# Patient Record
Sex: MALE | Race: WHITE | NOT HISPANIC OR LATINO | Employment: OTHER | URBAN - METROPOLITAN AREA
[De-identification: names, ages, dates, MRNs, and addresses within clinical notes are randomized per-mention and may not be internally consistent; named-entity substitution may affect disease eponyms.]

---

## 2018-04-10 PROCEDURE — 96361 HYDRATE IV INFUSION ADD-ON: CPT

## 2018-04-10 PROCEDURE — 96365 THER/PROPH/DIAG IV INF INIT: CPT

## 2018-04-10 PROCEDURE — 99285 EMERGENCY DEPT VISIT HI MDM: CPT | Mod: 25

## 2018-04-11 ENCOUNTER — HOSPITAL ENCOUNTER (INPATIENT)
Facility: HOSPITAL | Age: 68
LOS: 2 days | Discharge: HOME | DRG: 418 | End: 2018-04-13
Attending: EMERGENCY MEDICINE | Admitting: SURGERY
Payer: MEDICARE

## 2018-04-11 DIAGNOSIS — R10.13 EPIGASTRIC PAIN: ICD-10-CM

## 2018-04-11 DIAGNOSIS — K83.1 BILIARY OBSTRUCTION: Primary | ICD-10-CM

## 2018-04-11 DIAGNOSIS — N17.9 AKI (ACUTE KIDNEY INJURY): ICD-10-CM

## 2018-04-11 DIAGNOSIS — K86.2 PANCREATIC CYST: ICD-10-CM

## 2018-04-11 DIAGNOSIS — R10.9 ABDOMINAL PAIN: ICD-10-CM

## 2018-04-11 DIAGNOSIS — K80.11 CALCULUS OF GALLBLADDER WITH CHOLECYSTITIS WITH BILIARY OBSTRUCTION, UNSPECIFIED CHOLECYSTITIS ACUITY: ICD-10-CM

## 2018-04-11 DIAGNOSIS — R79.89 ELEVATED LFTS: ICD-10-CM

## 2018-04-11 PROBLEM — K80.20 CALCULUS OF GALLBLADDER: Status: ACTIVE | Noted: 2018-04-11

## 2018-04-11 LAB
ALBUMIN SERPL BCP-MCNC: 3.4 G/DL
ALBUMIN SERPL BCP-MCNC: 4.1 G/DL
ALP SERPL-CCNC: 188 U/L
ALP SERPL-CCNC: 194 U/L
ALT SERPL W/O P-5'-P-CCNC: 894 U/L
ALT SERPL W/O P-5'-P-CCNC: 952 U/L
ANION GAP SERPL CALC-SCNC: 12 MMOL/L
AST SERPL-CCNC: 858 U/L
AST SERPL-CCNC: 961 U/L
BASOPHILS # BLD AUTO: 0.02 K/UL
BASOPHILS NFR BLD: 0.3 %
BILIRUB DIRECT SERPL-MCNC: 3.2 MG/DL
BILIRUB SERPL-MCNC: 4.8 MG/DL
BILIRUB SERPL-MCNC: 5.2 MG/DL
BILIRUB UR QL STRIP: ABNORMAL
BUN SERPL-MCNC: 25 MG/DL
CALCIUM SERPL-MCNC: 10.5 MG/DL
CHLORIDE SERPL-SCNC: 101 MMOL/L
CLARITY UR: CLEAR
CO2 SERPL-SCNC: 27 MMOL/L
COLOR UR: YELLOW
CREAT SERPL-MCNC: 1.5 MG/DL
DIFFERENTIAL METHOD: ABNORMAL
EOSINOPHIL # BLD AUTO: 0 K/UL
EOSINOPHIL NFR BLD: 0.5 %
ERYTHROCYTE [DISTWIDTH] IN BLOOD BY AUTOMATED COUNT: 13.7 %
EST. GFR  (AFRICAN AMERICAN): 55 ML/MIN/1.73 M^2
EST. GFR  (NON AFRICAN AMERICAN): 47 ML/MIN/1.73 M^2
GLUCOSE SERPL-MCNC: 157 MG/DL
GLUCOSE UR QL STRIP: NEGATIVE
HCT VFR BLD AUTO: 44.4 %
HGB BLD-MCNC: 15 G/DL
HGB UR QL STRIP: NEGATIVE
KETONES UR QL STRIP: NEGATIVE
LEUKOCYTE ESTERASE UR QL STRIP: NEGATIVE
LIPASE SERPL-CCNC: 61 U/L
LYMPHOCYTES # BLD AUTO: 0.5 K/UL
LYMPHOCYTES NFR BLD: 6.1 %
MAGNESIUM SERPL-MCNC: 2 MG/DL
MCH RBC QN AUTO: 29.5 PG
MCHC RBC AUTO-ENTMCNC: 33.8 G/DL
MCV RBC AUTO: 87 FL
MONOCYTES # BLD AUTO: 0.5 K/UL
MONOCYTES NFR BLD: 6.8 %
NEUTROPHILS # BLD AUTO: 6.5 K/UL
NEUTROPHILS NFR BLD: 86.3 %
NITRITE UR QL STRIP: NEGATIVE
PH UR STRIP: 6 [PH] (ref 5–8)
PLATELET # BLD AUTO: 237 K/UL
PMV BLD AUTO: 8.6 FL
POTASSIUM SERPL-SCNC: 4 MMOL/L
PROT SERPL-MCNC: 6.4 G/DL
PROT SERPL-MCNC: 7.8 G/DL
PROT UR QL STRIP: ABNORMAL
RBC # BLD AUTO: 5.08 M/UL
SODIUM SERPL-SCNC: 140 MMOL/L
SP GR UR STRIP: 1.02 (ref 1–1.03)
URN SPEC COLLECT METH UR: ABNORMAL
UROBILINOGEN UR STRIP-ACNC: 1 EU/DL
WBC # BLD AUTO: 7.48 K/UL

## 2018-04-11 PROCEDURE — 83690 ASSAY OF LIPASE: CPT

## 2018-04-11 PROCEDURE — 99222 1ST HOSP IP/OBS MODERATE 55: CPT | Mod: AI,,, | Performed by: SURGERY

## 2018-04-11 PROCEDURE — 80053 COMPREHEN METABOLIC PANEL: CPT

## 2018-04-11 PROCEDURE — 11000001 HC ACUTE MED/SURG PRIVATE ROOM

## 2018-04-11 PROCEDURE — 25500020 PHARM REV CODE 255: Performed by: EMERGENCY MEDICINE

## 2018-04-11 PROCEDURE — 81003 URINALYSIS AUTO W/O SCOPE: CPT

## 2018-04-11 PROCEDURE — 99222 1ST HOSP IP/OBS MODERATE 55: CPT | Mod: ,,, | Performed by: INTERNAL MEDICINE

## 2018-04-11 PROCEDURE — 85025 COMPLETE CBC W/AUTO DIFF WBC: CPT

## 2018-04-11 PROCEDURE — 25000003 PHARM REV CODE 250: Performed by: EMERGENCY MEDICINE

## 2018-04-11 PROCEDURE — 83735 ASSAY OF MAGNESIUM: CPT

## 2018-04-11 PROCEDURE — 80076 HEPATIC FUNCTION PANEL: CPT

## 2018-04-11 PROCEDURE — 63600175 PHARM REV CODE 636 W HCPCS: Performed by: PHYSICIAN ASSISTANT

## 2018-04-11 RX ORDER — LEVOTHYROXINE SODIUM 150 UG/1
150 TABLET ORAL DAILY
COMMUNITY
Start: 2018-01-29 | End: 2019-01-29

## 2018-04-11 RX ORDER — ONDANSETRON 2 MG/ML
4 INJECTION INTRAMUSCULAR; INTRAVENOUS EVERY 8 HOURS PRN
Status: DISCONTINUED | OUTPATIENT
Start: 2018-04-11 | End: 2018-04-13 | Stop reason: HOSPADM

## 2018-04-11 RX ORDER — DEXTROSE MONOHYDRATE, SODIUM CHLORIDE, AND POTASSIUM CHLORIDE 50; 1.49; 4.5 G/1000ML; G/1000ML; G/1000ML
INJECTION, SOLUTION INTRAVENOUS CONTINUOUS
Status: DISCONTINUED | OUTPATIENT
Start: 2018-04-11 | End: 2018-04-13

## 2018-04-11 RX ORDER — BUTORPHANOL TARTRATE 2 MG/ML
1 INJECTION INTRAMUSCULAR; INTRAVENOUS ONCE
Status: DISCONTINUED | OUTPATIENT
Start: 2018-04-11 | End: 2018-04-11

## 2018-04-11 RX ORDER — ONDANSETRON 2 MG/ML
4 INJECTION INTRAMUSCULAR; INTRAVENOUS
Status: DISCONTINUED | OUTPATIENT
Start: 2018-04-11 | End: 2018-04-11

## 2018-04-11 RX ORDER — ATORVASTATIN CALCIUM 40 MG/1
40 TABLET, FILM COATED ORAL DAILY
COMMUNITY
Start: 2018-01-29

## 2018-04-11 RX ORDER — LISINOPRIL AND HYDROCHLOROTHIAZIDE 10; 12.5 MG/1; MG/1
1 TABLET ORAL DAILY
COMMUNITY
Start: 2018-01-29

## 2018-04-11 RX ADMIN — SODIUM CHLORIDE 1000 ML: 0.9 INJECTION, SOLUTION INTRAVENOUS at 02:04

## 2018-04-11 RX ADMIN — IOHEXOL 30 ML: 350 INJECTION, SOLUTION INTRAVENOUS at 01:04

## 2018-04-11 RX ADMIN — DEXTROSE MONOHYDRATE, SODIUM CHLORIDE, AND POTASSIUM CHLORIDE: 50; 4.5; 1.49 INJECTION, SOLUTION INTRAVENOUS at 07:04

## 2018-04-11 RX ADMIN — ONDANSETRON 4 MG: 2 INJECTION INTRAMUSCULAR; INTRAVENOUS at 11:04

## 2018-04-11 RX ADMIN — DEXTROSE MONOHYDRATE, SODIUM CHLORIDE, AND POTASSIUM CHLORIDE: 50; 4.5; 1.49 INJECTION, SOLUTION INTRAVENOUS at 06:04

## 2018-04-11 RX ADMIN — IOHEXOL 75 ML: 350 INJECTION, SOLUTION INTRAVENOUS at 03:04

## 2018-04-11 NOTE — ASSESSMENT & PLAN NOTE
Elevated LFTs likely related to CBD obstruction; however, none was noted on CT scan.   Agree with MRCP to rule out choledocholithiasis. Possible ERCP later today depending on results.   Repeat LFTs show continued elevation in total bilirubin.   Check acute hepatitis panel.

## 2018-04-11 NOTE — HPI
68-year-old male referred for gallstones/elevated LFTs.  The patient reports 12 hours of abdominal pain/nausea that improved once he was in the ER.  He underwent CT scan which showed gallstones and was noted to have elevated LFTs on labs.  Currently he denies any significant abdominal pain or nausea.  He denies any fevers/chills, change in bowels, emesis, chest pain or shortness of breath

## 2018-04-11 NOTE — H&P
[facility] -   General Surgery  History & Physical    Patient Name: Quincy Araiza  MRN: [id number]  Admission Date: 4/11/2018  Attending Physician: Jesus Cedeno MD   Primary Care Provider: Wade Pineda MD    Patient information was obtained from patient.     Subjective:     Chief Complaint/Reason for Admission: Cholelithiasis/elevated LFTs    History of Present Illness: 68-year-old male referred for gallstones/elevated LFTs.  The patient reports 12 hours of abdominal pain/nausea that improved once he was in the ER.  He underwent CT scan which showed gallstones and was noted to have elevated LFTs on labs.  Currently he denies any significant abdominal pain or nausea.  He denies any fevers/chills, change in bowels, emesis, chest pain or shortness of breath    No current facility-administered medications on file prior to encounter.      No current outpatient prescriptions on file prior to encounter.       Review of patient's allergies indicates:   Allergen Reactions    Hydrocodone Hives       Past Medical History:   Diagnosis Date    Graves disease     Hyperlipidemia     Hypertension      History reviewed. No pertinent surgical history.  Family History     None        Social History Main Topics    Smoking status: Never Smoker    Smokeless tobacco: Never Used    Alcohol use No    Drug use: No    Sexual activity: Not on file     Review of Systems   Constitutional: Negative for chills, fever and unexpected weight change.   HENT: Negative for congestion.    Eyes: Negative for visual disturbance.   Respiratory: Negative for shortness of breath.    Cardiovascular: Negative for chest pain.   Gastrointestinal: Positive for abdominal pain and nausea. Negative for abdominal distention, constipation, rectal pain and vomiting.   Genitourinary: Negative for dysuria.   Musculoskeletal: Negative for arthralgias.   Skin: Negative for rash.   Neurological: Negative for light-headedness.   Hematological:  Negative for adenopathy.     Objective:     Vital Signs (Most Recent):  Temp: 98 °F (36.7 °C) (04/11/18 0536)  Pulse: 65 (04/11/18 0655)  Resp: 18 (04/11/18 0655)  BP: (!) 144/70 (04/11/18 0655)  SpO2: 95 % (04/11/18 0655) Vital Signs (24h Range):  Temp:  [98 °F (36.7 °C)-98.5 °F (36.9 °C)] 98 °F (36.7 °C)  Pulse:  [65-73] 65  Resp:  [18-20] 18  SpO2:  [95 %-97 %] 95 %  BP: (121-144)/(64-70) 144/70     Weight: 97.2 kg (214 lb 4.6 oz)  Body mass index is 27.51 kg/m².    Physical Exam   Constitutional: He is oriented to person, place, and time. He appears well-developed and well-nourished. No distress.   HENT:   Head: Normocephalic and atraumatic.   Eyes: EOM are normal.   Neck: Normal range of motion. Neck supple.   Cardiovascular: Normal rate and regular rhythm.    Pulmonary/Chest: Effort normal and breath sounds normal.   Abdominal: Soft. Bowel sounds are normal. He exhibits no distension. There is no tenderness.   Neurological: He is alert and oriented to person, place, and time.   Skin: Skin is warm and dry. He is not diaphoretic.   Vitals reviewed.      Significant Labs:  CBC:   Recent Labs  Lab 04/11/18  0137   WBC 7.48   RBC 5.08   HGB 15.0   HCT 44.4      MCV 87   MCH 29.5   MCHC 33.8     CMP:   Recent Labs  Lab 04/11/18 0137   *   CALCIUM 10.5   ALBUMIN 4.1   PROT 7.8      K 4.0   CO2 27      BUN 25*   CREATININE 1.5*   ALKPHOS 188*   *   *   BILITOT 4.8*       Significant Diagnostics:  CT:  Small sliding hiatal hernia with contrast seen within the distal esophagus which may reflect reflux disease and/or esophagitis.    1.6 cm hypodense lesion within the body the pancreas is not fully characterized on the current exam. Recommend followup MRI.    Gallstone within the gallbladder fundus with mild wall thickening. No evidence of pericholecystic fluid.. Ultrasound can be obtained as clinically warranted.    Hazy stranding within the mesenteric fat with small lymph nodes  can be seen in the setting of mesenteric adenitis or panniculitis    Assessment/Plan:     * Elevated LFTs    GI consult  MRCP to evaluate for choledocholithiasis or CBD obstruction  If obstruction noted will likely need ERCP, if no obstruction or plan for cholecystectomy tomorrow        Calculus of gallbladder    Patient will likely need cholecystectomy prior to discharge.  Undergoing evaluation for choledocholithiasis          VTE Risk Mitigation         Ordered     IP VTE LOW RISK PATIENT  Once      04/11/18 0625     Place SHELLI hose  Until discontinued      04/11/18 0625     Place sequential compression device  Until discontinued      04/11/18 0625          Jesus Cedeno MD  General Surgery  [facility] [place]

## 2018-04-11 NOTE — ED NOTES
Pt lying in bed, aaox4, in no acute distress, and with no complaint. Pt aware he is awaiting general surgery consult. Will continue to monitor. Bed in low position, side rails up, call light in reach, spouse at bedside.

## 2018-04-11 NOTE — ED PROVIDER NOTES
SCRIBE #1 NOTE: I, Sarika Tijerina, am scribing for, and in the presence of, Bahman Jeffers MD. I have scribed the HPI, ROS, and PEx.    SCRIBE #2 NOTE: I, Sharath Kruger, am scribing for, and in the presence of,  Santo Khan MD. I have scribed the remaining portions of the note not scribed by Scribe #1.     History      Chief Complaint   Patient presents with    Abdominal Pain     pt reports abdominal cramping that started after lunch and has worsened as the day went on; denies n/v/d       Review of patient's allergies indicates:   Allergen Reactions    Hydrocodone Hives        HPI   HPI    4/11/2018, 12:26 AM   History obtained from the patient      History of Present Illness: Quincy Araiza is a 68 y.o. male patient with PMHx of Prostate CA, PSHx Prostatectomy who presents to the Emergency Department for epigastric abd pain which onset gradually after pt ate lunch yesterday. Symptoms are worsening and moderate in severity. Standing/sitting upright mitigate sxs, no exacerbating factors. No other associated sxs. Patient denies any fever, chills, n/v/d, constipation, hematochezia, dysuria, hematuria, frequency, EtOH use, and all other sxs at this time. Pt's last BM was at 2000 yesterday. No further complaints or concerns at this time.       Arrival mode: Personal vehicle     PCP: Wade Pineda MD       Past Medical History:  Past Medical History:   Diagnosis Date    Graves disease     Hyperlipidemia     Hypertension        Past Surgical History:  History reviewed. No pertinent surgical history.      Family History:  History reviewed. No pertinent family history.    Social History:  Social History     Social History Main Topics    Smoking status: Never Smoker    Smokeless tobacco: Never Used    Alcohol use No    Drug use: No    Sexual activity: Unknown       ROS   Review of Systems   Constitutional: Negative for chills and fever.   HENT: Negative for sore throat.    Respiratory: Negative  for shortness of breath.    Cardiovascular: Negative for chest pain.   Gastrointestinal: Positive for abdominal pain (epigastric). Negative for blood in stool, constipation, diarrhea, nausea and vomiting.   Genitourinary: Negative for dysuria, frequency and hematuria.   Musculoskeletal: Negative for back pain.   Skin: Negative for rash.   Neurological: Negative for weakness.   Hematological: Does not bruise/bleed easily.   All other systems reviewed and are negative.    Physical Exam      Initial Vitals [04/10/18 2321]   BP Pulse Resp Temp SpO2   128/68 73 20 98.5 °F (36.9 °C) 95 %      MAP       88          Physical Exam  Nursing Notes and Vital Signs Reviewed.  Constitutional: Patient is in mild distress. Well-developed and well-nourished. Appears uncomfortable.   Head: Atraumatic. Normocephalic.  Eyes: PERRL. EOM intact. Conjunctivae are not pale. No scleral icterus.  ENT: Mucous membranes are moist. Oropharynx is clear and symmetric.    Neck: Supple. Full ROM. No lymphadenopathy.  Cardiovascular: Regular rate. Regular rhythm. No murmurs, rubs, or gallops. Distal pulses are 2+ and symmetric.  Pulmonary/Chest: No respiratory distress. Clear to auscultation bilaterally. No wheezing or rales.  Abdominal: Soft and non-distended.  There is mild, generalized tenderness. Mild guarding to epigastric region. No rebound or rigidity. Good bowel sounds. No masses.   Musculoskeletal: Moves all extremities. No obvious deformities. No edema. No calf tenderness.  Skin: Warm and dry.  Neurological:  Alert, awake, and appropriate.  Normal speech.  No acute focal neurological deficits are appreciated.  Psychiatric: Normal affect. Good eye contact. Appropriate in content.    ED Course    Procedures  ED Vital Signs:  Vitals:    04/10/18 2321 04/11/18 0135 04/11/18 0226 04/11/18 0330   BP: 128/68 121/67 125/66 129/64   Pulse: 73 67 68 71   Resp: 20 18 18 18   Temp: 98.5 °F (36.9 °C) 98.1 °F (36.7 °C)     TempSrc: Oral Oral     SpO2:  "95% 97% 97% 97%   Weight: 97.2 kg (214 lb 4.6 oz)      Height: 6' 2" (1.88 m)       04/11/18 0423 04/11/18 0536 04/11/18 0655 04/11/18 0910   BP: 125/69 124/68 (!) 144/70 (!) 148/82   Pulse: 69 71 65 71   Resp: 18 18 18 18   Temp:  98 °F (36.7 °C)  97.8 °F (36.6 °C)   TempSrc:  Oral  Oral   SpO2: 96% 96% 95% 95%   Weight:    98.7 kg (217 lb 9.5 oz)   Height:    6' 2" (1.88 m)    04/11/18 1129 04/11/18 1622 04/11/18 1929 04/11/18 2329   BP: 121/73 124/64 128/69 129/73   Pulse: 66 72 75 73   Resp: 18 18 18 18   Temp: 98.1 °F (36.7 °C) 98.3 °F (36.8 °C) 97.9 °F (36.6 °C) 98.7 °F (37.1 °C)   TempSrc: Oral Oral Oral Oral   SpO2: 96% (!) 94% (!) 93% 96%   Weight:       Height:           Abnormal Lab Results:  Labs Reviewed   CBC W/ AUTO DIFFERENTIAL - Abnormal; Notable for the following:        Result Value    MPV 8.6 (*)     Lymph # 0.5 (*)     Gran% 86.3 (*)     Lymph% 6.1 (*)     All other components within normal limits   COMPREHENSIVE METABOLIC PANEL - Abnormal; Notable for the following:     Glucose 157 (*)     BUN, Bld 25 (*)     Creatinine 1.5 (*)     Total Bilirubin 4.8 (*)     Alkaline Phosphatase 188 (*)      (*)      (*)     eGFR if  55 (*)     eGFR if non  47 (*)     All other components within normal limits   LIPASE - Abnormal; Notable for the following:     Lipase 61 (*)     All other components within normal limits   URINALYSIS - Abnormal; Notable for the following:     Protein, UA Trace (*)     Bilirubin (UA) 2+ (*)     All other components within normal limits   MAGNESIUM        All Lab Results:  Results for orders placed or performed during the hospital encounter of 04/11/18   Magnesium   Result Value Ref Range    Magnesium 2.0 1.6 - 2.6 mg/dL   CBC auto differential   Result Value Ref Range    WBC 7.48 3.90 - 12.70 K/uL    RBC 5.08 4.60 - 6.20 M/uL    Hemoglobin 15.0 14.0 - 18.0 g/dL    Hematocrit 44.4 40.0 - 54.0 %    MCV 87 82 - 98 fL    MCH 29.5 27.0 - " 31.0 pg    MCHC 33.8 32.0 - 36.0 g/dL    RDW 13.7 11.5 - 14.5 %    Platelets 237 150 - 350 K/uL    MPV 8.6 (L) 9.2 - 12.9 fL    Gran # (ANC) 6.5 1.8 - 7.7 K/uL    Lymph # 0.5 (L) 1.0 - 4.8 K/uL    Mono # 0.5 0.3 - 1.0 K/uL    Eos # 0.0 0.0 - 0.5 K/uL    Baso # 0.02 0.00 - 0.20 K/uL    Gran% 86.3 (H) 38.0 - 73.0 %    Lymph% 6.1 (L) 18.0 - 48.0 %    Mono% 6.8 4.0 - 15.0 %    Eosinophil% 0.5 0.0 - 8.0 %    Basophil% 0.3 0.0 - 1.9 %    Differential Method Automated    Comprehensive metabolic panel   Result Value Ref Range    Sodium 140 136 - 145 mmol/L    Potassium 4.0 3.5 - 5.1 mmol/L    Chloride 101 95 - 110 mmol/L    CO2 27 23 - 29 mmol/L    Glucose 157 (H) 70 - 110 mg/dL    BUN, Bld 25 (H) 8 - 23 mg/dL    Creatinine 1.5 (H) 0.5 - 1.4 mg/dL    Calcium 10.5 8.7 - 10.5 mg/dL    Total Protein 7.8 6.0 - 8.4 g/dL    Albumin 4.1 3.5 - 5.2 g/dL    Total Bilirubin 4.8 (H) 0.1 - 1.0 mg/dL    Alkaline Phosphatase 188 (H) 55 - 135 U/L     (H) 10 - 40 U/L     (H) 10 - 44 U/L    Anion Gap 12 8 - 16 mmol/L    eGFR if African American 55 (A) >60 mL/min/1.73 m^2    eGFR if non African American 47 (A) >60 mL/min/1.73 m^2   Lipase   Result Value Ref Range    Lipase 61 (H) 4 - 60 U/L   Urinalysis   Result Value Ref Range    Specimen UA Urine, Clean Catch     Color, UA Yellow Yellow, Straw, Sofya    Appearance, UA Clear Clear    pH, UA 6.0 5.0 - 8.0    Specific Gravity, UA 1.025 1.005 - 1.030    Protein, UA Trace (A) Negative    Glucose, UA Negative Negative    Ketones, UA Negative Negative    Bilirubin (UA) 2+ (A) Negative    Occult Blood UA Negative Negative    Nitrite, UA Negative Negative    Urobilinogen, UA 1.0 <2.0 EU/dL    Leukocytes, UA Negative Negative   Hepatic function panel   Result Value Ref Range    Total Protein 6.4 6.0 - 8.4 g/dL    Albumin 3.4 (L) 3.5 - 5.2 g/dL    Total Bilirubin 5.2 (H) 0.1 - 1.0 mg/dL    Bilirubin, Direct 3.2 (H) 0.1 - 0.3 mg/dL     (H) 10 - 40 U/L     (H) 10 - 44 U/L     "Alkaline Phosphatase 194 (H) 55 - 135 U/L       Imaging Results:    Per [facility] radi[facility], pt's CT  Abdomen and Pelvis with IV contrast results:   1) Small sliding hiatal hernia. Small amount of contrast in distal esophagus, which is mildly thickened, suggesting possible reflux esophagitis.  2) Gallstone in the gallbladder fundus. Gallbladder is not distended and there is no CT evidence of cholecystitis.  3) Mild increased density of central mesenteric fat and prominent number of mesenteric lymph nodes, nonspecific but suggesting possible mesenteric adenitis.  4) 1.6 cm hypodense lesion within the body of the pancreas. Recommend a follow-up abdominal MRI in 1 year.              The Emergency Provider reviewed the vital signs and test results, which are outlined above.    ED Discussion     1:36 AM: Pt states his pain worsened after the abdominal exam. He states he walked around the room and became nauseous. He states he "felt a pop" in his abdomen. After this pop his pain and nausea subsided. Pt is agreeable to continue with workup but states he no longer needs pain medication or anti-emetics.     2:10 AM: Pt reports dark urine, possible hematuria, at this time. He states this was not present last time her urinated.     2:15 AM: Dr. Jeffers transfers care of pt to Dr. Khan, pending CT/lab results.    5:04 AM: Dr. Khan discussed the pt's case with Dr. Cedeno (General Surgery) who recommends admitting pt to general surgery and consulting GI.    5:27 AM: Discussed case with Dr. Cedeno (General Surgery) who agrees with current care and management of pt and accepts admission.   Admitting Service: General Surgery  Admitting Physician: Dr. Cedeno  Admit to: Med/surg    5:27 AM: Re-evaluated pt. I have discussed test results, shared treatment plan, and the need for admission with patient and family at bedside. Pt and family express understanding at this time and agree with all information. All questions answered. Pt and " family have no further questions or concerns at this time. Pt is ready for admit.        ED Medication(s):  Medications   dextrose 5 % and 0.45 % NaCl with KCl 20 mEq infusion ( Intravenous New Bag 4/11/18 1823)   ondansetron injection 4 mg (4 mg Intravenous Given 4/11/18 1116)   sodium chloride 0.9% bolus 1,000 mL (0 mLs Intravenous Stopped 4/11/18 0423)   omnipaque 350 iohexol 30 mL (30 mLs Oral Given 4/11/18 0100)   omnipaque 350 iohexol 75 mL (75 mLs Intravenous Given 4/11/18 0312)       Current Discharge Medication List                Medical Decision Making    Medical Decision Making:   Clinical Tests:   Lab Tests: Ordered and Reviewed  Radiological Study: Ordered and Reviewed           Scribe Attestation:   Scribe #1: I performed the above scribed service and the documentation accurately describes the services I performed. I attest to the accuracy of the note.    Attending:   Physician Attestation Statement for Scribe #1: I, Bahman Jeffers MD, personally performed the services described in this documentation, as scribed by Sarika Tijerina, in my presence, and it is both accurate and complete.       Scribe Attestation:   Scribe #2: I performed the above scribed service and the documentation accurately describes the services I performed. I attest to the accuracy of the note.    Attending Attestation:           Physician Attestation for Scribe:    Physician Attestation Statement for Scribe #2: I, Santo Khan MD, reviewed documentation, as scribed by Sharath Kruger in my presence, and it is both accurate and complete. I also acknowledge and confirm the content of the note done by Scribe #1.          Clinical Impression       ICD-10-CM ICD-9-CM   1. Biliary obstruction K83.1 576.2   2. Abdominal pain R10.9 789.00   3. MICHAEL (acute kidney injury) N17.9 584.9   4. Elevated LFTs R79.89 790.6   5. Calculus of gallbladder with cholecystitis with biliary obstruction, unspecified cholecystitis acuity K80.01 574.01    6. Epigastric pain R10.13 789.06   7. Pancreatic cyst K86.2 577.2       Disposition:   Disposition: Admitted  Condition: Fair         Santo Khan MD  04/12/18 0222       Santo Khan MD  04/12/18 0224

## 2018-04-11 NOTE — HPI
The patient presented to the ER for abdominal pain. The pain started yesterday after lunch with worsening throughout the evening. He reports spontaneous resolution while in the ER. He didn't have nausea or vomiting initially but reports mild nausea now. He denies any lower GI symptoms. ER work up revealed elevated LFTs and a CT scan showed gallstone with wall thickening. General surgery has admitted him and an MRCP was ordered. The patient denies risk factors for viral hepatitis. He takes NSAIDs about three days a week and a baby Aspirin at least once daily.

## 2018-04-11 NOTE — SUBJECTIVE & OBJECTIVE
Past Medical History:   Diagnosis Date    Graves disease     Hyperlipidemia     Hypertension        History reviewed. No pertinent surgical history.    Review of patient's allergies indicates:   Allergen Reactions    Hydrocodone Hives     Family History     None        Social History Main Topics    Smoking status: Never Smoker    Smokeless tobacco: Never Used    Alcohol use No    Drug use: No    Sexual activity: Not on file     Review of Systems   Constitutional: Negative for fatigue and fever.   HENT: Negative for hearing loss.    Eyes: Negative for visual disturbance.   Respiratory: Negative for cough and shortness of breath.    Cardiovascular: Negative for chest pain and palpitations.   Gastrointestinal:        As per HPI.   Genitourinary: Negative for difficulty urinating, dysuria, frequency and hematuria.   Musculoskeletal: Positive for arthralgias. Negative for back pain.   Skin: Negative for color change and rash.   Neurological: Negative for dizziness, seizures, syncope, weakness, numbness and headaches.   Hematological: Does not bruise/bleed easily.   Psychiatric/Behavioral: The patient is not nervous/anxious.      Objective:     Vital Signs (Most Recent):  Temp: 97.8 °F (36.6 °C) (04/11/18 0910)  Pulse: 71 (04/11/18 0910)  Resp: 18 (04/11/18 0910)  BP: (!) 148/82 (04/11/18 0910)  SpO2: 95 % (04/11/18 0910) Vital Signs (24h Range):  Temp:  [97.8 °F (36.6 °C)-98.5 °F (36.9 °C)] 97.8 °F (36.6 °C)  Pulse:  [65-73] 71  Resp:  [18-20] 18  SpO2:  [95 %-97 %] 95 %  BP: (121-148)/(64-82) 148/82     Weight: 98.7 kg (217 lb 9.5 oz) (04/11/18 0910)  Body mass index is 27.94 kg/m².      Intake/Output Summary (Last 24 hours) at 04/11/18 1107  Last data filed at 04/11/18 0429   Gross per 24 hour   Intake             1000 ml   Output                0 ml   Net             1000 ml       Lines/Drains/Airways     Peripheral Intravenous Line                 Peripheral IV - Single Lumen 04/11/18 0137 Right Antecubital  less than 1 day                Physical Exam   Constitutional: He is oriented to person, place, and time. He appears well-developed and well-nourished.   HENT:   Head: Normocephalic and atraumatic.   Eyes: EOM are normal.   Neck: Normal range of motion. Neck supple.   Cardiovascular: Normal rate, regular rhythm and normal heart sounds.    No murmur heard.  Pulmonary/Chest: Effort normal and breath sounds normal. No respiratory distress. He has no wheezes.   Abdominal: Soft. Bowel sounds are normal. He exhibits no distension and no mass. There is no hepatomegaly. There is no tenderness.   Musculoskeletal: He exhibits no edema.   Neurological: He is alert and oriented to person, place, and time. No cranial nerve deficit. Gait normal.   Skin: Skin is warm and dry. No rash noted.   Psychiatric: He has a normal mood and affect.       Significant Labs:  CBC:   Recent Labs  Lab 04/11/18 0137   WBC 7.48   HGB 15.0   HCT 44.4        CMP:   Recent Labs  Lab 04/11/18 0137 04/11/18  0826   *  --    CALCIUM 10.5  --    ALBUMIN 4.1 3.4*   PROT 7.8 6.4     --    K 4.0  --    CO2 27  --      --    BUN 25*  --    CREATININE 1.5*  --    ALKPHOS 188* 194*   * 952*   * 858*   BILITOT 4.8* 5.2*       Significant Imaging:  Imaging results within the past 24 hours have been reviewed.

## 2018-04-11 NOTE — ED NOTES
Pt denies the need for pain or nausea meds, stating he feels much better. Pt with no current complaint. Pt also refusing fluids at this time.

## 2018-04-11 NOTE — ASSESSMENT & PLAN NOTE
Patient will likely need cholecystectomy prior to discharge.  Undergoing evaluation for choledocholithiasis

## 2018-04-11 NOTE — ED NOTES
Pt sitting upright in bed, aaox4, in no acute distress, and with no complaint. Pt aware he is awaiting CT and results. Will continue to monitor. Bed in low position, side rails up, call light in reach, spouse at bedside.

## 2018-04-11 NOTE — PLAN OF CARE
Problem: Patient Care Overview  Goal: Plan of Care Review  Outcome: Ongoing (interventions implemented as appropriate)  Fall precautions maintained, pt free from injuries/fall.  Repositions and ambulates independently.  C/o some abdominal discomfort and some nausea.  PRN meds given as needed.  NPO at this time, awaiting MRCP.  POC and meds discussed with pt, pt verbalized understanding.  Side rails x 2, bed locked and low, phone and call light w/in reach.  Chart check done. Will cont to monitor.

## 2018-04-11 NOTE — ED NOTES
Pt states he does not want to change into gown at this time. Pt to remain in personal clothes upon request.

## 2018-04-11 NOTE — ED NOTES
Pt lying in bed, aaox4, in no acute distress, and with no complaint. Pt states he spoke with Dr Khan and is aware of plan to admit for biliary obstruction. Will continue to monitor. Bed in low position, side rails up, call light in reach.

## 2018-04-11 NOTE — CONSULTS
[facility] -   Gastroenterology  Consult Note    Patient Name: Quincy Araiza  MRN: [id number]  Admission Date: 4/11/2018  Hospital Length of Stay: 0 days  Code Status: Full Code   Attending Provider: Jesus Cedeno MD   Consulting Provider: Stormy Mello PA-C  Primary Care Physician: Wade Pineda MD  Principal Problem:Elevated LFTs    Inpatient consult to Gastroenterology  Consult performed by: STORMY MELLO  Consult ordered by: VI DURAN  Reason for consult: Suspected choledocholithiasis        Subjective:     HPI:  The patient presented to the ER for abdominal pain. The pain started yesterday after lunch with worsening throughout the evening. He reports spontaneous resolution while in the ER. He didn't have nausea or vomiting initially but reports mild nausea now. He denies any lower GI symptoms. ER work up revealed elevated LFTs and a CT scan showed gallstone with wall thickening. General surgery has admitted him and an MRCP was ordered. The patient denies risk factors for viral hepatitis. He takes NSAIDs about three days a week and a baby Aspirin at least once daily.     Past Medical History:   Diagnosis Date    Graves disease     Hyperlipidemia     Hypertension        History reviewed. No pertinent surgical history.    Review of patient's allergies indicates:   Allergen Reactions    Hydrocodone Hives     Family History     None        Social History Main Topics    Smoking status: Never Smoker    Smokeless tobacco: Never Used    Alcohol use No    Drug use: No    Sexual activity: Not on file     Review of Systems   Constitutional: Negative for fatigue and fever.   HENT: Negative for hearing loss.    Eyes: Negative for visual disturbance.   Respiratory: Negative for cough and shortness of breath.    Cardiovascular: Negative for chest pain and palpitations.   Gastrointestinal:        As per HPI.   Genitourinary: Negative for difficulty urinating, dysuria, frequency and  hematuria.   Musculoskeletal: Positive for arthralgias. Negative for back pain.   Skin: Negative for color change and rash.   Neurological: Negative for dizziness, seizures, syncope, weakness, numbness and headaches.   Hematological: Does not bruise/bleed easily.   Psychiatric/Behavioral: The patient is not nervous/anxious.      Objective:     Vital Signs (Most Recent):  Temp: 97.8 °F (36.6 °C) (04/11/18 0910)  Pulse: 71 (04/11/18 0910)  Resp: 18 (04/11/18 0910)  BP: (!) 148/82 (04/11/18 0910)  SpO2: 95 % (04/11/18 0910) Vital Signs (24h Range):  Temp:  [97.8 °F (36.6 °C)-98.5 °F (36.9 °C)] 97.8 °F (36.6 °C)  Pulse:  [65-73] 71  Resp:  [18-20] 18  SpO2:  [95 %-97 %] 95 %  BP: (121-148)/(64-82) 148/82     Weight: 98.7 kg (217 lb 9.5 oz) (04/11/18 0910)  Body mass index is 27.94 kg/m².      Intake/Output Summary (Last 24 hours) at 04/11/18 1107  Last data filed at 04/11/18 0423   Gross per 24 hour   Intake             1000 ml   Output                0 ml   Net             1000 ml       Lines/Drains/Airways     Peripheral Intravenous Line                 Peripheral IV - Single Lumen 04/11/18 0137 Right Antecubital less than 1 day                Physical Exam   Constitutional: He is oriented to person, place, and time. He appears well-developed and well-nourished.   HENT:   Head: Normocephalic and atraumatic.   Eyes: EOM are normal.   Neck: Normal range of motion. Neck supple.   Cardiovascular: Normal rate, regular rhythm and normal heart sounds.    No murmur heard.  Pulmonary/Chest: Effort normal and breath sounds normal. No respiratory distress. He has no wheezes.   Abdominal: Soft. Bowel sounds are normal. He exhibits no distension and no mass. There is no hepatomegaly. There is no tenderness.   Musculoskeletal: He exhibits no edema.   Neurological: He is alert and oriented to person, place, and time. No cranial nerve deficit. Gait normal.   Skin: Skin is warm and dry. No rash noted.   Psychiatric: He has a normal  mood and affect.       Significant Labs:  CBC:   Recent Labs  Lab 04/11/18 0137   WBC 7.48   HGB 15.0   HCT 44.4        CMP:   Recent Labs  Lab 04/11/18 0137 04/11/18  0826   *  --    CALCIUM 10.5  --    ALBUMIN 4.1 3.4*   PROT 7.8 6.4     --    K 4.0  --    CO2 27  --      --    BUN 25*  --    CREATININE 1.5*  --    ALKPHOS 188* 194*   * 952*   * 858*   BILITOT 4.8* 5.2*       Significant Imaging:  Imaging results within the past 24 hours have been reviewed.    Assessment/Plan:     * Elevated LFTs    Elevated LFTs likely related to CBD obstruction; however, none was noted on CT scan.   Agree with MRCP to rule out choledocholithiasis. Possible ERCP later today depending on results.   Repeat LFTs show continued elevation in total bilirubin.   Check acute hepatitis panel.           Calculus of gallbladder    Agree with MRCP.   Cholecystectomy planned during this admission.         Abnormal CT scan  The patient also had a pancreatic lesion on CT scan. Wait results of MRCP.     Thank you for your consult. I will follow-up with patient. Please contact us if you have any additional questions.    Rojelio Mello PA-C  Gastroenterology  [facility]

## 2018-04-11 NOTE — SUBJECTIVE & OBJECTIVE
No current facility-administered medications on file prior to encounter.      No current outpatient prescriptions on file prior to encounter.       Review of patient's allergies indicates:   Allergen Reactions    Hydrocodone Hives       Past Medical History:   Diagnosis Date    Graves disease     Hyperlipidemia     Hypertension      History reviewed. No pertinent surgical history.  Family History     None        Social History Main Topics    Smoking status: Never Smoker    Smokeless tobacco: Never Used    Alcohol use No    Drug use: No    Sexual activity: Not on file     Review of Systems   Constitutional: Negative for chills, fever and unexpected weight change.   HENT: Negative for congestion.    Eyes: Negative for visual disturbance.   Respiratory: Negative for shortness of breath.    Cardiovascular: Negative for chest pain.   Gastrointestinal: Positive for abdominal pain and nausea. Negative for abdominal distention, constipation, rectal pain and vomiting.   Genitourinary: Negative for dysuria.   Musculoskeletal: Negative for arthralgias.   Skin: Negative for rash.   Neurological: Negative for light-headedness.   Hematological: Negative for adenopathy.     Objective:     Vital Signs (Most Recent):  Temp: 98 °F (36.7 °C) (04/11/18 0536)  Pulse: 65 (04/11/18 0655)  Resp: 18 (04/11/18 0655)  BP: (!) 144/70 (04/11/18 0655)  SpO2: 95 % (04/11/18 0655) Vital Signs (24h Range):  Temp:  [98 °F (36.7 °C)-98.5 °F (36.9 °C)] 98 °F (36.7 °C)  Pulse:  [65-73] 65  Resp:  [18-20] 18  SpO2:  [95 %-97 %] 95 %  BP: (121-144)/(64-70) 144/70     Weight: 97.2 kg (214 lb 4.6 oz)  Body mass index is 27.51 kg/m².    Physical Exam   Constitutional: He is oriented to person, place, and time. He appears well-developed and well-nourished. No distress.   HENT:   Head: Normocephalic and atraumatic.   Eyes: EOM are normal.   Neck: Normal range of motion. Neck supple.   Cardiovascular: Normal rate and regular rhythm.     Pulmonary/Chest: Effort normal and breath sounds normal.   Abdominal: Soft. Bowel sounds are normal. He exhibits no distension. There is no tenderness.   Neurological: He is alert and oriented to person, place, and time.   Skin: Skin is warm and dry. He is not diaphoretic.   Vitals reviewed.      Significant Labs:  CBC:   Recent Labs  Lab 04/11/18 0137   WBC 7.48   RBC 5.08   HGB 15.0   HCT 44.4      MCV 87   MCH 29.5   MCHC 33.8     CMP:   Recent Labs  Lab 04/11/18 0137   *   CALCIUM 10.5   ALBUMIN 4.1   PROT 7.8      K 4.0   CO2 27      BUN 25*   CREATININE 1.5*   ALKPHOS 188*   *   *   BILITOT 4.8*       Significant Diagnostics:  CT:  Small sliding hiatal hernia with contrast seen within the distal esophagus which may reflect reflux disease and/or esophagitis.    1.6 cm hypodense lesion within the body the pancreas is not fully characterized on the current exam. Recommend followup MRI.    Gallstone within the gallbladder fundus with mild wall thickening. No evidence of pericholecystic fluid.. Ultrasound can be obtained as clinically warranted.    Hazy stranding within the mesenteric fat with small lymph nodes can be seen in the setting of mesenteric adenitis or panniculitis

## 2018-04-11 NOTE — ED NOTES
Pt sitting upright in bed, aaox4, in no acute distress, and with no complaint. Pt aware he is awaiting results. Will continue to monitor. Bed in low position, side rails up, call light in reach, spouse at bedside.

## 2018-04-11 NOTE — ED NOTES
Pt AAOx3, resting in bed, side rails up x 2, call bell within reach. NAD at this time. Will continue to monitor.

## 2018-04-11 NOTE — H&P
[facility] -   General Surgery  History & Physical    Patient Name: Quincy Araiza  MRN: [id number]  Admission Date: 4/11/2018  Attending Physician: Jesus Cedeno MD   Primary Care Provider: Wade Pineda MD    Patient information was obtained from patient.     Subjective:     Chief Complaint/Reason for Admission: Elevated LFTs, cholelithiasis    History of Present Illness: 68-year-old male referred for gallstones/elevated LFTs.  The patient reports 12 hours of abdominal pain/nausea that improved once he was in the ER.  He underwent CT scan which showed gallstones and was noted to have elevated LFTs on labs.  Currently he denies any significant abdominal pain or nausea.  He denies any fevers/chills, change in bowels, emesis, chest pain or shortness of breath    No current facility-administered medications on file prior to encounter.      No current outpatient prescriptions on file prior to encounter.       Review of patient's allergies indicates:   Allergen Reactions    Hydrocodone Hives       Past Medical History:   Diagnosis Date    Graves disease     Hyperlipidemia     Hypertension      History reviewed. No pertinent surgical history.  Family History     None        Social History Main Topics    Smoking status: Never Smoker    Smokeless tobacco: Never Used    Alcohol use No    Drug use: No    Sexual activity: Not on file     Review of Systems   Constitutional: Negative for chills, fever and unexpected weight change.   HENT: Negative for congestion.    Eyes: Negative for visual disturbance.   Respiratory: Negative for shortness of breath.    Cardiovascular: Negative for chest pain.   Gastrointestinal: Positive for abdominal pain and nausea. Negative for abdominal distention, constipation, rectal pain and vomiting.   Genitourinary: Negative for dysuria.   Musculoskeletal: Negative for arthralgias.   Skin: Negative for rash.   Neurological: Negative for light-headedness.   Hematological:  Negative for adenopathy.     Objective:     Vital Signs (Most Recent):  Temp: 98 °F (36.7 °C) (04/11/18 0536)  Pulse: 65 (04/11/18 0655)  Resp: 18 (04/11/18 0655)  BP: (!) 144/70 (04/11/18 0655)  SpO2: 95 % (04/11/18 0655) Vital Signs (24h Range):  Temp:  [98 °F (36.7 °C)-98.5 °F (36.9 °C)] 98 °F (36.7 °C)  Pulse:  [65-73] 65  Resp:  [18-20] 18  SpO2:  [95 %-97 %] 95 %  BP: (121-144)/(64-70) 144/70     Weight: 97.2 kg (214 lb 4.6 oz)  Body mass index is 27.51 kg/m².    Physical Exam   Constitutional: He is oriented to person, place, and time. He appears well-developed and well-nourished. No distress.   HENT:   Head: Normocephalic and atraumatic.   Eyes: EOM are normal.   Neck: Normal range of motion. Neck supple.   Cardiovascular: Normal rate and regular rhythm.    Pulmonary/Chest: Effort normal and breath sounds normal.   Abdominal: Soft. Bowel sounds are normal. He exhibits no distension. There is no tenderness.   Neurological: He is alert and oriented to person, place, and time.   Skin: Skin is warm and dry. He is not diaphoretic.   Vitals reviewed.      Significant Labs:  CBC:   Recent Labs  Lab 04/11/18  0137   WBC 7.48   RBC 5.08   HGB 15.0   HCT 44.4      MCV 87   MCH 29.5   MCHC 33.8     CMP:   Recent Labs  Lab 04/11/18 0137   *   CALCIUM 10.5   ALBUMIN 4.1   PROT 7.8      K 4.0   CO2 27      BUN 25*   CREATININE 1.5*   ALKPHOS 188*   *   *   BILITOT 4.8*       Significant Diagnostics:  CT:  Small sliding hiatal hernia with contrast seen within the distal esophagus which may reflect reflux disease and/or esophagitis.    1.6 cm hypodense lesion within the body the pancreas is not fully characterized on the current exam. Recommend followup MRI.    Gallstone within the gallbladder fundus with mild wall thickening. No evidence of pericholecystic fluid.. Ultrasound can be obtained as clinically warranted.    Hazy stranding within the mesenteric fat with small lymph nodes  can be seen in the setting of mesenteric adenitis or panniculitis    Assessment/Plan:     Calculus of gallbladder    Patient will likely need cholecystectomy prior to discharge.  Undergoing evaluation for choledocholithiasis          VTE Risk Mitigation         Ordered     IP VTE LOW RISK PATIENT  Once      04/11/18 0625     Place SHELLI hose  Until discontinued      04/11/18 0625     Place sequential compression device  Until discontinued      04/11/18 0625          Jesus Cedeno MD  General Surgery  [facility]

## 2018-04-12 ENCOUNTER — SURGERY (OUTPATIENT)
Age: 68
End: 2018-04-12

## 2018-04-12 ENCOUNTER — ANESTHESIA (OUTPATIENT)
Dept: SURGERY | Facility: HOSPITAL | Age: 68
DRG: 418 | End: 2018-04-12
Payer: MEDICARE

## 2018-04-12 ENCOUNTER — ANESTHESIA EVENT (OUTPATIENT)
Dept: SURGERY | Facility: HOSPITAL | Age: 68
DRG: 418 | End: 2018-04-12
Payer: MEDICARE

## 2018-04-12 LAB
ALBUMIN SERPL BCP-MCNC: 3.4 G/DL
ALP SERPL-CCNC: 214 U/L
ALT SERPL W/O P-5'-P-CCNC: 721 U/L
ANION GAP SERPL CALC-SCNC: 7 MMOL/L
AST SERPL-CCNC: 516 U/L
BASOPHILS # BLD AUTO: 0.03 K/UL
BASOPHILS NFR BLD: 0.6 %
BILIRUB SERPL-MCNC: 2.5 MG/DL
BUN SERPL-MCNC: 11 MG/DL
CALCIUM SERPL-MCNC: 8.8 MG/DL
CHLORIDE SERPL-SCNC: 107 MMOL/L
CO2 SERPL-SCNC: 27 MMOL/L
CREAT SERPL-MCNC: 1.1 MG/DL
DIFFERENTIAL METHOD: ABNORMAL
EOSINOPHIL # BLD AUTO: 0.4 K/UL
EOSINOPHIL NFR BLD: 7.6 %
ERYTHROCYTE [DISTWIDTH] IN BLOOD BY AUTOMATED COUNT: 13.9 %
EST. GFR  (AFRICAN AMERICAN): >60 ML/MIN/1.73 M^2
EST. GFR  (NON AFRICAN AMERICAN): >60 ML/MIN/1.73 M^2
GLUCOSE SERPL-MCNC: 107 MG/DL
HCT VFR BLD AUTO: 41 %
HGB BLD-MCNC: 13.4 G/DL
LYMPHOCYTES # BLD AUTO: 0.9 K/UL
LYMPHOCYTES NFR BLD: 17.9 %
MCH RBC QN AUTO: 29.1 PG
MCHC RBC AUTO-ENTMCNC: 32.7 G/DL
MCV RBC AUTO: 89 FL
MONOCYTES # BLD AUTO: 0.6 K/UL
MONOCYTES NFR BLD: 11 %
NEUTROPHILS # BLD AUTO: 3.2 K/UL
NEUTROPHILS NFR BLD: 62.9 %
PLATELET # BLD AUTO: 193 K/UL
PMV BLD AUTO: 8.8 FL
POTASSIUM SERPL-SCNC: 3.8 MMOL/L
PROT SERPL-MCNC: 6.6 G/DL
RBC # BLD AUTO: 4.6 M/UL
SODIUM SERPL-SCNC: 141 MMOL/L
WBC # BLD AUTO: 5.02 K/UL

## 2018-04-12 PROCEDURE — 25000003 PHARM REV CODE 250: Performed by: EMERGENCY MEDICINE

## 2018-04-12 PROCEDURE — 88304 TISSUE EXAM BY PATHOLOGIST: CPT | Mod: 26,,, | Performed by: PATHOLOGY

## 2018-04-12 PROCEDURE — 27201423 OPTIME MED/SURG SUP & DEVICES STERILE SUPPLY: Performed by: SURGERY

## 2018-04-12 PROCEDURE — 36415 COLL VENOUS BLD VENIPUNCTURE: CPT

## 2018-04-12 PROCEDURE — 25000003 PHARM REV CODE 250: Performed by: SURGERY

## 2018-04-12 PROCEDURE — 36000711: Performed by: SURGERY

## 2018-04-12 PROCEDURE — 71000033 HC RECOVERY, INTIAL HOUR: Performed by: SURGERY

## 2018-04-12 PROCEDURE — 80053 COMPREHEN METABOLIC PANEL: CPT

## 2018-04-12 PROCEDURE — 63600175 PHARM REV CODE 636 W HCPCS: Performed by: SURGERY

## 2018-04-12 PROCEDURE — 63600175 PHARM REV CODE 636 W HCPCS: Performed by: ANESTHESIOLOGY

## 2018-04-12 PROCEDURE — 71000039 HC RECOVERY, EACH ADD'L HOUR: Performed by: SURGERY

## 2018-04-12 PROCEDURE — 0FT44ZZ RESECTION OF GALLBLADDER, PERCUTANEOUS ENDOSCOPIC APPROACH: ICD-10-PCS | Performed by: SURGERY

## 2018-04-12 PROCEDURE — 25000003 PHARM REV CODE 250: Performed by: NURSE ANESTHETIST, CERTIFIED REGISTERED

## 2018-04-12 PROCEDURE — 11000001 HC ACUTE MED/SURG PRIVATE ROOM

## 2018-04-12 PROCEDURE — 88304 TISSUE EXAM BY PATHOLOGIST: CPT | Performed by: PATHOLOGY

## 2018-04-12 PROCEDURE — 36000710: Performed by: SURGERY

## 2018-04-12 PROCEDURE — 47562 LAPAROSCOPIC CHOLECYSTECTOMY: CPT | Mod: ,,, | Performed by: SURGERY

## 2018-04-12 PROCEDURE — 8E0W4CZ ROBOTIC ASSISTED PROCEDURE OF TRUNK REGION, PERCUTANEOUS ENDOSCOPIC APPROACH: ICD-10-PCS | Performed by: SURGERY

## 2018-04-12 PROCEDURE — 85025 COMPLETE CBC W/AUTO DIFF WBC: CPT

## 2018-04-12 PROCEDURE — 63600175 PHARM REV CODE 636 W HCPCS: Performed by: NURSE ANESTHETIST, CERTIFIED REGISTERED

## 2018-04-12 PROCEDURE — 37000008 HC ANESTHESIA 1ST 15 MINUTES: Performed by: SURGERY

## 2018-04-12 PROCEDURE — 37000009 HC ANESTHESIA EA ADD 15 MINS: Performed by: SURGERY

## 2018-04-12 RX ORDER — ATORVASTATIN CALCIUM 40 MG/1
40 TABLET, FILM COATED ORAL DAILY
Status: DISCONTINUED | OUTPATIENT
Start: 2018-04-13 | End: 2018-04-13 | Stop reason: HOSPADM

## 2018-04-12 RX ORDER — OXYCODONE HYDROCHLORIDE 5 MG/1
5 TABLET ORAL
Status: DISCONTINUED | OUTPATIENT
Start: 2018-04-12 | End: 2018-04-12 | Stop reason: HOSPADM

## 2018-04-12 RX ORDER — BUPIVACAINE HYDROCHLORIDE 2.5 MG/ML
INJECTION, SOLUTION EPIDURAL; INFILTRATION; INTRACAUDAL
Status: DISCONTINUED | OUTPATIENT
Start: 2018-04-12 | End: 2018-04-12 | Stop reason: HOSPADM

## 2018-04-12 RX ORDER — GLYCOPYRROLATE 0.2 MG/ML
INJECTION INTRAMUSCULAR; INTRAVENOUS
Status: DISCONTINUED | OUTPATIENT
Start: 2018-04-12 | End: 2018-04-12

## 2018-04-12 RX ORDER — SODIUM CHLORIDE 0.9 % (FLUSH) 0.9 %
3 SYRINGE (ML) INJECTION
Status: DISCONTINUED | OUTPATIENT
Start: 2018-04-12 | End: 2018-04-13 | Stop reason: HOSPADM

## 2018-04-12 RX ORDER — RAMELTEON 8 MG/1
8 TABLET ORAL NIGHTLY PRN
Status: DISCONTINUED | OUTPATIENT
Start: 2018-04-12 | End: 2018-04-13 | Stop reason: HOSPADM

## 2018-04-12 RX ORDER — DEXAMETHASONE SODIUM PHOSPHATE 4 MG/ML
INJECTION, SOLUTION INTRA-ARTICULAR; INTRALESIONAL; INTRAMUSCULAR; INTRAVENOUS; SOFT TISSUE
Status: DISCONTINUED | OUTPATIENT
Start: 2018-04-12 | End: 2018-04-12

## 2018-04-12 RX ORDER — INDOCYANINE GREEN AND WATER 25 MG
2.5 KIT INJECTION ONCE
Status: COMPLETED | OUTPATIENT
Start: 2018-04-12 | End: 2018-04-12

## 2018-04-12 RX ORDER — CEFAZOLIN SODIUM 1 G/3ML
2 INJECTION, POWDER, FOR SOLUTION INTRAMUSCULAR; INTRAVENOUS
Status: DISCONTINUED | OUTPATIENT
Start: 2018-04-12 | End: 2018-04-13 | Stop reason: HOSPADM

## 2018-04-12 RX ORDER — HYDROCODONE BITARTRATE AND ACETAMINOPHEN 10; 325 MG/1; MG/1
1 TABLET ORAL EVERY 4 HOURS PRN
Status: DISCONTINUED | OUTPATIENT
Start: 2018-04-12 | End: 2018-04-13 | Stop reason: HOSPADM

## 2018-04-12 RX ORDER — LIDOCAINE HYDROCHLORIDE 10 MG/ML
INJECTION INFILTRATION; PERINEURAL
Status: DISCONTINUED | OUTPATIENT
Start: 2018-04-12 | End: 2018-04-12

## 2018-04-12 RX ORDER — LIDOCAINE HYDROCHLORIDE 20 MG/ML
JELLY TOPICAL
Status: DISCONTINUED | OUTPATIENT
Start: 2018-04-12 | End: 2018-04-12

## 2018-04-12 RX ORDER — ACETAMINOPHEN 10 MG/ML
1000 INJECTION, SOLUTION INTRAVENOUS ONCE
Status: COMPLETED | OUTPATIENT
Start: 2018-04-12 | End: 2018-04-12

## 2018-04-12 RX ORDER — SODIUM CHLORIDE 0.9 % (FLUSH) 0.9 %
3 SYRINGE (ML) INJECTION EVERY 8 HOURS
Status: DISCONTINUED | OUTPATIENT
Start: 2018-04-12 | End: 2018-04-12 | Stop reason: HOSPADM

## 2018-04-12 RX ORDER — ROCURONIUM BROMIDE 10 MG/ML
INJECTION, SOLUTION INTRAVENOUS
Status: DISCONTINUED | OUTPATIENT
Start: 2018-04-12 | End: 2018-04-12

## 2018-04-12 RX ORDER — METOCLOPRAMIDE HYDROCHLORIDE 5 MG/ML
10 INJECTION INTRAMUSCULAR; INTRAVENOUS EVERY 10 MIN PRN
Status: DISCONTINUED | OUTPATIENT
Start: 2018-04-12 | End: 2018-04-12 | Stop reason: HOSPADM

## 2018-04-12 RX ORDER — HYDROCHLOROTHIAZIDE 12.5 MG/1
12.5 TABLET ORAL DAILY
Status: DISCONTINUED | OUTPATIENT
Start: 2018-04-13 | End: 2018-04-13 | Stop reason: HOSPADM

## 2018-04-12 RX ORDER — MIDAZOLAM HYDROCHLORIDE 1 MG/ML
INJECTION, SOLUTION INTRAMUSCULAR; INTRAVENOUS
Status: DISCONTINUED | OUTPATIENT
Start: 2018-04-12 | End: 2018-04-12

## 2018-04-12 RX ORDER — LISINOPRIL 10 MG/1
10 TABLET ORAL DAILY
Status: DISCONTINUED | OUTPATIENT
Start: 2018-04-13 | End: 2018-04-13 | Stop reason: HOSPADM

## 2018-04-12 RX ORDER — FENTANYL CITRATE 50 UG/ML
INJECTION, SOLUTION INTRAMUSCULAR; INTRAVENOUS
Status: DISCONTINUED | OUTPATIENT
Start: 2018-04-12 | End: 2018-04-12

## 2018-04-12 RX ORDER — ONDANSETRON 2 MG/ML
INJECTION INTRAMUSCULAR; INTRAVENOUS
Status: DISCONTINUED | OUTPATIENT
Start: 2018-04-12 | End: 2018-04-12

## 2018-04-12 RX ORDER — SUCCINYLCHOLINE CHLORIDE 20 MG/ML
INJECTION INTRAMUSCULAR; INTRAVENOUS
Status: DISCONTINUED | OUTPATIENT
Start: 2018-04-12 | End: 2018-04-12

## 2018-04-12 RX ORDER — MEPERIDINE HYDROCHLORIDE 50 MG/ML
12.5 INJECTION INTRAMUSCULAR; INTRAVENOUS; SUBCUTANEOUS ONCE AS NEEDED
Status: COMPLETED | OUTPATIENT
Start: 2018-04-12 | End: 2018-04-12

## 2018-04-12 RX ORDER — PROPOFOL 10 MG/ML
VIAL (ML) INTRAVENOUS
Status: DISCONTINUED | OUTPATIENT
Start: 2018-04-12 | End: 2018-04-12

## 2018-04-12 RX ORDER — MORPHINE SULFATE 4 MG/ML
2 INJECTION, SOLUTION INTRAMUSCULAR; INTRAVENOUS EVERY 5 MIN PRN
Status: DISCONTINUED | OUTPATIENT
Start: 2018-04-12 | End: 2018-04-12 | Stop reason: HOSPADM

## 2018-04-12 RX ORDER — NEOSTIGMINE METHYLSULFATE 1 MG/ML
INJECTION, SOLUTION INTRAVENOUS
Status: DISCONTINUED | OUTPATIENT
Start: 2018-04-12 | End: 2018-04-12

## 2018-04-12 RX ORDER — ACETAMINOPHEN 325 MG/1
650 TABLET ORAL EVERY 6 HOURS PRN
Status: DISCONTINUED | OUTPATIENT
Start: 2018-04-12 | End: 2018-04-13 | Stop reason: HOSPADM

## 2018-04-12 RX ORDER — SODIUM CHLORIDE, SODIUM LACTATE, POTASSIUM CHLORIDE, CALCIUM CHLORIDE 600; 310; 30; 20 MG/100ML; MG/100ML; MG/100ML; MG/100ML
INJECTION, SOLUTION INTRAVENOUS CONTINUOUS PRN
Status: DISCONTINUED | OUTPATIENT
Start: 2018-04-12 | End: 2018-04-12

## 2018-04-12 RX ORDER — LIDOCAINE HYDROCHLORIDE 10 MG/ML
INJECTION, SOLUTION EPIDURAL; INFILTRATION; INTRACAUDAL; PERINEURAL
Status: DISCONTINUED | OUTPATIENT
Start: 2018-04-12 | End: 2018-04-12 | Stop reason: HOSPADM

## 2018-04-12 RX ORDER — HYDROMORPHONE HYDROCHLORIDE 1 MG/ML
0.5 INJECTION, SOLUTION INTRAMUSCULAR; INTRAVENOUS; SUBCUTANEOUS
Status: DISCONTINUED | OUTPATIENT
Start: 2018-04-12 | End: 2018-04-13 | Stop reason: HOSPADM

## 2018-04-12 RX ORDER — LISINOPRIL AND HYDROCHLOROTHIAZIDE 10; 12.5 MG/1; MG/1
1 TABLET ORAL DAILY
Status: DISCONTINUED | OUTPATIENT
Start: 2018-04-12 | End: 2018-04-12

## 2018-04-12 RX ORDER — LEVOTHYROXINE SODIUM 150 UG/1
150 TABLET ORAL
Status: DISCONTINUED | OUTPATIENT
Start: 2018-04-13 | End: 2018-04-13 | Stop reason: HOSPADM

## 2018-04-12 RX ORDER — DIPHENHYDRAMINE HYDROCHLORIDE 50 MG/ML
25 INJECTION INTRAMUSCULAR; INTRAVENOUS EVERY 4 HOURS PRN
Status: DISCONTINUED | OUTPATIENT
Start: 2018-04-12 | End: 2018-04-13 | Stop reason: HOSPADM

## 2018-04-12 RX ADMIN — ROCURONIUM BROMIDE 45 MG: 10 INJECTION, SOLUTION INTRAVENOUS at 01:04

## 2018-04-12 RX ADMIN — ROBINUL 0.4 MG: 0.2 INJECTION INTRAMUSCULAR; INTRAVENOUS at 02:04

## 2018-04-12 RX ADMIN — MORPHINE SULFATE 2 MG: 4 INJECTION INTRAVENOUS at 03:04

## 2018-04-12 RX ADMIN — SUCCINYLCHOLINE CHLORIDE 200 MG: 20 INJECTION, SOLUTION INTRAMUSCULAR; INTRAVENOUS at 01:04

## 2018-04-12 RX ADMIN — HYDROCODONE BITARTRATE AND ACETAMINOPHEN 1 TABLET: 10; 325 TABLET ORAL at 08:04

## 2018-04-12 RX ADMIN — SODIUM CHLORIDE, SODIUM LACTATE, POTASSIUM CHLORIDE, AND CALCIUM CHLORIDE: 600; 310; 30; 20 INJECTION, SOLUTION INTRAVENOUS at 02:04

## 2018-04-12 RX ADMIN — LIDOCAINE HYDROCHLORIDE 1 EACH: 20 JELLY TOPICAL at 01:04

## 2018-04-12 RX ADMIN — LIDOCAINE HYDROCHLORIDE 10 ML: 10 INJECTION, SOLUTION EPIDURAL; INFILTRATION; INTRACAUDAL; PERINEURAL at 02:04

## 2018-04-12 RX ADMIN — MORPHINE SULFATE 2 MG: 4 INJECTION INTRAVENOUS at 04:04

## 2018-04-12 RX ADMIN — INDOCYANINE GREEN AND WATER 2.5 MG: KIT at 01:04

## 2018-04-12 RX ADMIN — ACETAMINOPHEN 1000 MG: 10 INJECTION, SOLUTION INTRAVENOUS at 04:04

## 2018-04-12 RX ADMIN — ONDANSETRON 4 MG: 2 INJECTION, SOLUTION INTRAMUSCULAR; INTRAVENOUS at 02:04

## 2018-04-12 RX ADMIN — ROCURONIUM BROMIDE 5 MG: 10 INJECTION, SOLUTION INTRAVENOUS at 01:04

## 2018-04-12 RX ADMIN — DEXAMETHASONE SODIUM PHOSPHATE 4 MG: 4 INJECTION, SOLUTION INTRA-ARTICULAR; INTRALESIONAL; INTRAMUSCULAR; INTRAVENOUS; SOFT TISSUE at 01:04

## 2018-04-12 RX ADMIN — FENTANYL CITRATE 100 MCG: 50 INJECTION, SOLUTION INTRAMUSCULAR; INTRAVENOUS at 01:04

## 2018-04-12 RX ADMIN — CEFAZOLIN 2 G: 1 INJECTION, POWDER, FOR SOLUTION INTRAMUSCULAR; INTRAVENOUS at 01:04

## 2018-04-12 RX ADMIN — NEOSTIGMINE METHYLSULFATE 2.5 MG: 1 INJECTION INTRAVENOUS at 02:04

## 2018-04-12 RX ADMIN — LIDOCAINE HYDROCHLORIDE 50 MG: 10 INJECTION, SOLUTION INFILTRATION; PERINEURAL at 01:04

## 2018-04-12 RX ADMIN — PROPOFOL 150 MG: 10 INJECTION, EMULSION INTRAVENOUS at 01:04

## 2018-04-12 RX ADMIN — MIDAZOLAM 2 MG: 1 INJECTION INTRAMUSCULAR; INTRAVENOUS at 12:04

## 2018-04-12 RX ADMIN — DEXTROSE MONOHYDRATE, SODIUM CHLORIDE, AND POTASSIUM CHLORIDE 1000 ML: 50; 4.5; 1.49 INJECTION, SOLUTION INTRAVENOUS at 03:04

## 2018-04-12 RX ADMIN — BUPIVACAINE HYDROCHLORIDE 10 ML: 2.5 INJECTION, SOLUTION EPIDURAL; INFILTRATION; INTRACAUDAL; PERINEURAL at 02:04

## 2018-04-12 RX ADMIN — SODIUM CHLORIDE, SODIUM LACTATE, POTASSIUM CHLORIDE, AND CALCIUM CHLORIDE: 600; 310; 30; 20 INJECTION, SOLUTION INTRAVENOUS at 12:04

## 2018-04-12 RX ADMIN — MEPERIDINE HYDROCHLORIDE 12.5 MG: 50 INJECTION INTRAMUSCULAR; INTRAVENOUS; SUBCUTANEOUS at 03:04

## 2018-04-12 RX ADMIN — PROPOFOL 50 MG: 10 INJECTION, EMULSION INTRAVENOUS at 01:04

## 2018-04-12 NOTE — ANESTHESIA PREPROCEDURE EVALUATION
04/12/2018  Quincy Araiza is a 68 y.o., male.    Anesthesia Evaluation    I have reviewed the Patient Summary Reports.    I have reviewed the Nursing Notes.   I have reviewed the Medications.     Review of Systems  Anesthesia Hx:  No problems with previous Anesthesia  Denies Family Hx of Anesthesia complications.    Social:  Non-Smoker    Cardiovascular:   Exercise tolerance: good Hypertension hyperlipidemia    Pulmonary:  Pulmonary Normal    Neurological:  Neurology Normal    Endocrine:   Hypothyroidism        Physical Exam  General:  Well nourished    Airway/Jaw/Neck:  Airway Findings: Tongue: Normal Mallampati: III  Improves to II with phonation.      Dental:  DENTAL FINDINGS: Normal   Chest/Lungs:  Chest/Lungs Findings: Normal Respiratory Rate     Heart/Vascular:  Heart Findings: Normal            Anesthesia Plan  Type of Anesthesia, risks & benefits discussed:  Anesthesia Type:  general  Patient's Preference:   Intra-op Monitoring Plan:   Intra-op Monitoring Plan Comments:   Post Op Pain Control Plan:   Post Op Pain Control Plan Comments:   Induction:   IV  Beta Blocker:  Patient is not currently on a Beta-Blocker (No further documentation required).       Informed Consent: Patient understands risks and agrees with Anesthesia plan.  Questions answered. Anesthesia consent signed with patient.  ASA Score: 2     Day of Surgery Review of History & Physical: I have interviewed and examined the patient. I have reviewed the patient's H&P dated:  There are no significant changes.          Ready For Surgery From Anesthesia Perspective.

## 2018-04-12 NOTE — PROGRESS NOTES
[facility] -   General Surgery  Progress Note    Subjective:     History of Present Illness:  68-year-old male referred for gallstones/elevated LFTs.  The patient reports 12 hours of abdominal pain/nausea that improved once he was in the ER.  He underwent CT scan which showed gallstones and was noted to have elevated LFTs on labs.  Currently he denies any significant abdominal pain or nausea.  He denies any fevers/chills, change in bowels, emesis, chest pain or shortness of breath    Post-Op Info:  Procedure(s) (LRB):  [id number] (N/A)   Day of Surgery     Interval History: no acute events overnight    Medications:  Continuous Infusions:   dextrose 5 % and 0.45 % NaCl with KCl 20 mEq 1,000 mL (04/12/18 0305)     Scheduled Meds:  PRN Meds:ondansetron, sodium chloride 0.9%     Review of patient's allergies indicates:   Allergen Reactions    Hydrocodone Hives     Objective:     Vital Signs (Most Recent):  Temp: 97.9 °F (36.6 °C) (04/12/18 0713)  Pulse: 70 (04/12/18 0713)  Resp: 16 (04/12/18 0713)  BP: 124/75 (04/12/18 0713)  SpO2: 95 % (04/12/18 0900) Vital Signs (24h Range):  Temp:  [97.8 °F (36.6 °C)-98.7 °F (37.1 °C)] 97.9 °F (36.6 °C)  Pulse:  [67-75] 70  Resp:  [16-18] 16  SpO2:  [93 %-96 %] 95 %  BP: (103-129)/(59-75) 124/75     Weight: 98.7 kg (217 lb 9.5 oz)  Body mass index is 27.94 kg/m².    Intake/Output - Last 3 Shifts       04/10 0700 - 04/11 0659 04/11 0700 - 04/12 0659 04/12 0700 - 04/13 0659    P.O.  240 0    I.V. (mL/kg)  1260.4 (12.8)     IV Piggyback 1000      Total Intake(mL/kg) 1000 (10.3) 1500.4 (15.2) 0 (0)    Net +1000 +1500.4 0           Urine Occurrence  3 x 2 x          Physical Exam   Constitutional: He is oriented to person, place, and time. He appears well-developed and well-nourished.   HENT:   Head: Normocephalic and atraumatic.   Eyes: EOM are normal.   Neck: Normal range of motion. Neck supple.   Cardiovascular: Normal rate and regular rhythm.     Pulmonary/Chest: Effort normal and breath sounds normal.   Abdominal: Soft. Bowel sounds are normal. He exhibits no distension. There is no tenderness.   Neurological: He is alert and oriented to person, place, and time.   Skin: Skin is warm and dry.   Vitals reviewed.      Significant Labs:  CBC:   Recent Labs  Lab 04/12/18 0434   WBC 5.02   RBC 4.60   HGB 13.4*   HCT 41.0      MCV 89   MCH 29.1   MCHC 32.7     CMP:   Recent Labs  Lab 04/12/18 0434      CALCIUM 8.8   ALBUMIN 3.4*   PROT 6.6      K 3.8   CO2 27      BUN 11   CREATININE 1.1   ALKPHOS 214*   *   *   BILITOT 2.5*       Significant Diagnostics:  MRCP:   1.7 cm stone in the fundus of the gallbladder.  Diffuse gallbladder wall thickening.  Normal appearing pancreatic and bile ducts.  No choledocholithiasis.  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    Assessment/Plan:     * Elevated LFTs    No choledocholithiasis        Calculus of gallbladder    Robotic cholecystectomy   Npo, ivfs            Jesus Cedeno MD  General Surgery  [facility]

## 2018-04-12 NOTE — OP NOTE
[facility]  Surgery Department  Operative Note    SUMMARY     Date of Procedure: 4/12/2018     Procedure: Procedure(s) (LRB):  [id number] (N/A)     Surgeon(s) and Role:     * Jesus Cedeno MD - Primary    Assisting Surgeon: None    Pre-Operative Diagnosis: Calculus of gallbladder with cholecystitis with biliary obstruction, unspecified cholecystitis acuity [K80.01]    Post-Operative Diagnosis: Post-Op Diagnosis Codes:     * Calculus of gallbladder with cholecystitis with biliary obstruction, unspecified cholecystitis acuity [K80.01]    Anesthesia: General    Technical Procedures Used: Robotic cholecystectomy    Description of the Findings of the Procedure: Cholecystitis    Complications: No    Estimated Blood Loss (EBL): * No values recorded between 4/12/2018  1:30 PM and 4/12/2018  2:55 PM *           Implants: * No implants in log *    Specimens:   Specimen (12h ago through future)    Start     Ordered    04/12/18 1427  Specimen to Pathology - Surgery  Once     Comments:  1. GallbladderDx:  Calculus of gallbladder with cholecystitis with biliary obstruction      04/12/18 1427                  Condition: Good    Disposition: PACU - hemodynamically stable.    Procedure in detail:  The patient was taken to the operating room and underwent general anesthesia with orotracheal intubation.  A supraumbilical incision was made fascia grasped with Rosston's and elevated. A Verres needle was inserted, and achieved pneumoperitoneum. Once gained access, insufflation was achieved up to 15 mm Hg. Then four 8 mm metal trocars were positioned in a line on the abdomen toward the gall bladder. The patient was then positioned in reverse trendelenburg position, and docking was completed. The gall bladder was retracted cephalad, and the Calot's triangle was exposed. With the indocyanine Green 2.5 mg injected prior to the surgery, the biliary tree was visualized. The cystic duct and artery were identified,  and clipped twice distally and single proximally with Hemolock clips. The structures divided, and then gall bladder was then mobilized off the fossa. The gall bladder was then removed from the peritoneal cavity after placing in a 5 mm bag. With the help of Firefly, there was no evidence of bile leakage at the operative field.  The port site where the  gallbladder was removed fashion was closed with a 0 Vicryl suture.  The port sites were all injected with 0.25% Marcaine and wound closed in the usual fashion with 4-0 Monocryl. The incisions were dressed with derma Flex. The patient was later awakened and extubated.

## 2018-04-12 NOTE — PROGRESS NOTES
MRCP didn't show choledocholithiasis. LFTs trending down. Discussed with Surgery and patient going to OR today for cholecystectomy. IOC requested. Will check labs in AM.

## 2018-04-12 NOTE — PLAN OF CARE
Problem: Patient Care Overview  Goal: Plan of Care Review  Outcome: Ongoing (interventions implemented as appropriate)  Pt. Remained free from injury. Ambulates independently. IVF maintained. NPO status. 12hr chart check complete.

## 2018-04-12 NOTE — SUBJECTIVE & OBJECTIVE
Interval History: no acute events overnight    Medications:  Continuous Infusions:   dextrose 5 % and 0.45 % NaCl with KCl 20 mEq 1,000 mL (04/12/18 0305)     Scheduled Meds:  PRN Meds:ondansetron, sodium chloride 0.9%     Review of patient's allergies indicates:   Allergen Reactions    Hydrocodone Hives     Objective:     Vital Signs (Most Recent):  Temp: 97.9 °F (36.6 °C) (04/12/18 0713)  Pulse: 70 (04/12/18 0713)  Resp: 16 (04/12/18 0713)  BP: 124/75 (04/12/18 0713)  SpO2: 95 % (04/12/18 0900) Vital Signs (24h Range):  Temp:  [97.8 °F (36.6 °C)-98.7 °F (37.1 °C)] 97.9 °F (36.6 °C)  Pulse:  [67-75] 70  Resp:  [16-18] 16  SpO2:  [93 %-96 %] 95 %  BP: (103-129)/(59-75) 124/75     Weight: 98.7 kg (217 lb 9.5 oz)  Body mass index is 27.94 kg/m².    Intake/Output - Last 3 Shifts       04/10 0700 - 04/11 0659 04/11 0700 - 04/12 0659 04/12 0700 - 04/13 0659    P.O.  240 0    I.V. (mL/kg)  1260.4 (12.8)     IV Piggyback 1000      Total Intake(mL/kg) 1000 (10.3) 1500.4 (15.2) 0 (0)    Net +1000 +1500.4 0           Urine Occurrence  3 x 2 x          Physical Exam   Constitutional: He is oriented to person, place, and time. He appears well-developed and well-nourished.   HENT:   Head: Normocephalic and atraumatic.   Eyes: EOM are normal.   Neck: Normal range of motion. Neck supple.   Cardiovascular: Normal rate and regular rhythm.    Pulmonary/Chest: Effort normal and breath sounds normal.   Abdominal: Soft. Bowel sounds are normal. He exhibits no distension. There is no tenderness.   Neurological: He is alert and oriented to person, place, and time.   Skin: Skin is warm and dry.   Vitals reviewed.      Significant Labs:  CBC:   Recent Labs  Lab 04/12/18 0434   WBC 5.02   RBC 4.60   HGB 13.4*   HCT 41.0      MCV 89   MCH 29.1   MCHC 32.7     CMP:   Recent Labs  Lab 04/12/18 0434      CALCIUM 8.8   ALBUMIN 3.4*   PROT 6.6      K 3.8   CO2 27      BUN 11   CREATININE 1.1   ALKPHOS 214*   *    *   BILITOT 2.5*       Significant Diagnostics:  MRCP:   1.7 cm stone in the fundus of the gallbladder.  Diffuse gallbladder wall thickening.  Normal appearing pancreatic and bile ducts.  No choledocholithiasis.  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

## 2018-04-12 NOTE — TRANSFER OF CARE
"Anesthesia Transfer of Care Note    Patient: Quincy Araiza    Procedure(s) Performed: Procedure(s) (LRB):  [id number] (N/A)    Patient location: PACU    Anesthesia Type: general    Transport from OR: Transported from OR on room air with adequate spontaneous ventilation    Post pain: adequate analgesia    Post assessment: no apparent anesthetic complications    Post vital signs: stable    Level of consciousness: awake    Nausea/Vomiting: no nausea/vomiting    Complications: none    Transfer of care protocol was followed      Last vitals:   Visit Vitals  /75 (Patient Position: Lying)   Pulse 70   Temp 36.6 °C (97.9 °F) (Oral)   Resp 16   Ht 6' 2" (1.88 m)   Wt 98.7 kg (217 lb 9.5 oz)   SpO2 95%   BMI 27.94 kg/m²     "

## 2018-04-12 NOTE — PLAN OF CARE
Met with patient. Patient lives with his wife and is independent. No anticipated discharge needs.   Provided Discharge Planning Begins upon Admission, Discharge Planning Packet including Advance Directive Admission, [facility] Delivery information and contact information for . Explained role of case management in the transition of care.  Provided contact information for Serge Ramirez LCSW, .       [employer] [facility] [id number] - [place], [place] - [address] AT [facility] [address]  [address]  [facility] [place] [address]  Phone: [phone] Fax: [phone]    Wade Pineda MD  Payor: [employer] MANAGED MEDICARE / Plan: [employer] MEDICARE HMO / Product Type: Capitation /       04/12/18 1008   Discharge Assessment   Assessment Type Discharge Planning Assessment   Confirmed/corrected address and phone number on facesheet? Yes   Assessment information obtained from? Patient;Medical Record   Expected Length of Stay (days) (2)   Communicated expected length of stay with patient/caregiver yes   Prior to hospitilization cognitive status: Alert/Oriented   Prior to hospitalization functional status: Independent   Current cognitive status: Alert/Oriented   Current Functional Status: Independent   Facility Arrived From: (home)   Lives With spouse   Able to Return to Prior Arrangements yes   Is patient able to care for self after discharge? Yes   Patient's perception of discharge disposition home or selfcare   Readmission Within The Last 30 Days no previous admission in last 30 days   Patient currently being followed by outpatient case management? No   Equipment Currently Used at Home none   Do you have any problems affording any of your prescribed medications? No   Is the patient taking medications as prescribed? yes   Does the patient have transportation home? Yes   Transportation Available family or friend will provide;car   Discharge Plan A Home   Discharge Plan B  Home with family   Patient/Family In Agreement With Plan yes

## 2018-04-12 NOTE — PLAN OF CARE
Ambulated without difficulty in room and to stretcher. Report received and care assumed from jeanne clements rn. Pt escorted to preop via stretcher and accompanied by rn. Pt's wife, komal has not arrived yet. Floor will send to preop when she arrives.

## 2018-04-12 NOTE — ANESTHESIA POSTPROCEDURE EVALUATION
"Anesthesia Post Evaluation    Patient: Quincy Araiza    Procedure(s) Performed: Procedure(s) (LRB):  [id number] (N/A)    Final Anesthesia Type: general  Patient location during evaluation: PACU  Patient participation: Yes- Able to Participate  Level of consciousness: awake and alert  Post-procedure vital signs: reviewed and stable  Pain management: adequate  Airway patency: patent  PONV status at discharge: No PONV  Anesthetic complications: no      Cardiovascular status: blood pressure returned to baseline  Respiratory status: unassisted  Hydration status: euvolemic  Follow-up not needed.        Visit Vitals  /65   Pulse 65   Temp 36.9 °C (98.4 °F) (Temporal)   Resp 16   Ht 6' 2" (1.88 m)   Wt 98.7 kg (217 lb 9.5 oz)   SpO2 96%   BMI 27.94 kg/m²       Pain/Nubia Score: Pain Assessment Performed: Yes (4/12/2018  4:15 PM)  Presence of Pain: complains of pain/discomfort (4/12/2018  4:15 PM)  Pain Rating Prior to Med Admin: 4 (4/12/2018  4:26 PM)  Nubia Score: 10 (4/12/2018  4:15 PM)      "

## 2018-04-12 NOTE — ANESTHESIA RELEASE NOTE
"Anesthesia Release from PACU Note    Patient: Quincy Araiza    Procedure(s) Performed: Procedure(s) (LRB):  [id number] (N/A)    Anesthesia type: general    Post pain: Adequate analgesia    Post assessment: no apparent anesthetic complications    Last Vitals:   Visit Vitals  /75 (Patient Position: Lying)   Pulse 70   Temp 36.6 °C (97.9 °F) (Oral)   Resp 16   Ht 6' 2" (1.88 m)   Wt 98.7 kg (217 lb 9.5 oz)   SpO2 95%   BMI 27.94 kg/m²       Post vital signs: stable    Level of consciousness: awake    Nausea/Vomiting: no nausea/no vomiting    Complications: none    Airway Patency: patent    Respiratory: unassisted    Cardiovascular: stable and blood pressure at baseline    Hydration: euvolemic  "

## 2018-04-13 VITALS
TEMPERATURE: 98 F | BODY MASS INDEX: 28.32 KG/M2 | DIASTOLIC BLOOD PRESSURE: 63 MMHG | SYSTOLIC BLOOD PRESSURE: 111 MMHG | OXYGEN SATURATION: 96 % | RESPIRATION RATE: 18 BRPM | WEIGHT: 220.69 LBS | HEART RATE: 60 BPM | HEIGHT: 74 IN

## 2018-04-13 LAB
ALBUMIN SERPL BCP-MCNC: 3.1 G/DL
ALP SERPL-CCNC: 180 U/L
ALT SERPL W/O P-5'-P-CCNC: 471 U/L
ANION GAP SERPL CALC-SCNC: 7 MMOL/L
AST SERPL-CCNC: 297 U/L
BASOPHILS # BLD AUTO: 0.01 K/UL
BASOPHILS NFR BLD: 0.1 %
BILIRUB SERPL-MCNC: 1.1 MG/DL
BUN SERPL-MCNC: 18 MG/DL
CALCIUM SERPL-MCNC: 8.7 MG/DL
CHLORIDE SERPL-SCNC: 105 MMOL/L
CO2 SERPL-SCNC: 26 MMOL/L
CREAT SERPL-MCNC: 0.9 MG/DL
DIFFERENTIAL METHOD: ABNORMAL
EOSINOPHIL # BLD AUTO: 0 K/UL
EOSINOPHIL NFR BLD: 0.4 %
ERYTHROCYTE [DISTWIDTH] IN BLOOD BY AUTOMATED COUNT: 13.8 %
EST. GFR  (AFRICAN AMERICAN): >60 ML/MIN/1.73 M^2
EST. GFR  (NON AFRICAN AMERICAN): >60 ML/MIN/1.73 M^2
GLUCOSE SERPL-MCNC: 108 MG/DL
HCT VFR BLD AUTO: 39.1 %
HGB BLD-MCNC: 12.9 G/DL
LYMPHOCYTES # BLD AUTO: 0.9 K/UL
LYMPHOCYTES NFR BLD: 9.5 %
MCH RBC QN AUTO: 29.3 PG
MCHC RBC AUTO-ENTMCNC: 33 G/DL
MCV RBC AUTO: 89 FL
MONOCYTES # BLD AUTO: 0.9 K/UL
MONOCYTES NFR BLD: 9.3 %
NEUTROPHILS # BLD AUTO: 7.6 K/UL
NEUTROPHILS NFR BLD: 80.7 %
PLATELET # BLD AUTO: 189 K/UL
PMV BLD AUTO: 9.1 FL
POTASSIUM SERPL-SCNC: 4.1 MMOL/L
PROT SERPL-MCNC: 6.2 G/DL
RBC # BLD AUTO: 4.4 M/UL
SODIUM SERPL-SCNC: 138 MMOL/L
WBC # BLD AUTO: 9.36 K/UL

## 2018-04-13 PROCEDURE — 36415 COLL VENOUS BLD VENIPUNCTURE: CPT

## 2018-04-13 PROCEDURE — 85025 COMPLETE CBC W/AUTO DIFF WBC: CPT

## 2018-04-13 PROCEDURE — 25000003 PHARM REV CODE 250: Performed by: SURGERY

## 2018-04-13 PROCEDURE — 80053 COMPREHEN METABOLIC PANEL: CPT

## 2018-04-13 RX ORDER — OXYCODONE AND ACETAMINOPHEN 5; 325 MG/1; MG/1
1 TABLET ORAL EVERY 4 HOURS PRN
Qty: 30 TABLET | Refills: 0 | Status: SHIPPED | OUTPATIENT
Start: 2018-04-13

## 2018-04-13 RX ADMIN — LEVOTHYROXINE SODIUM 150 MCG: 150 TABLET ORAL at 05:04

## 2018-04-13 RX ADMIN — HYDROCODONE BITARTRATE AND ACETAMINOPHEN 1 TABLET: 10; 325 TABLET ORAL at 05:04

## 2018-04-13 NOTE — PLAN OF CARE
Problem: Patient Care Overview  Goal: Plan of Care Review  Outcome: Ongoing (interventions implemented as appropriate)  Patient remained free from injury. No c/o pain at this time but abd discomfort. Lap sites (4) dry and intact open to air. Calm. Resting with eyes closed between care. No distress noted. Oriented x3. Respirations even and non labored. IV patent and infusing. Bed locked and low. Call light in reach. Safety measures in place. Will continue to monitor. Reviewed plan of care. Patient verbalized understanding and teach back.    12hr chart check complete.

## 2018-04-13 NOTE — PLAN OF CARE
04/13/18 1320   Final Note   Assessment Type Final Discharge Note   Discharge Disposition Home

## 2018-04-13 NOTE — PLAN OF CARE
Problem: Patient Care Overview  Goal: Plan of Care Review  Outcome: Outcome(s) achieved Date Met: 04/13/18  Remains injury free. Pain managed with oral meds. Tolerating diet. Ambulating without difficulty. Stable condition. Discharging to home.

## 2018-04-13 NOTE — PLAN OF CARE
Problem: Patient Care Overview  Goal: Plan of Care Review  Outcome: Ongoing (interventions implemented as appropriate)  IVF maintained;No s/s of acute distress;ambulates independently;PRN pain meds managed adequately. 12hr chart check complete.

## 2018-04-13 NOTE — NURSING
Discharge instructions, written and verbal, given to patient , verbalizes understanding. Patient states that his wife will come after 1200 to pick him up. Stable for discharge.

## 2018-04-16 ENCOUNTER — OFFICE VISIT (OUTPATIENT)
Dept: SURGERY | Facility: CLINIC | Age: 68
End: 2018-04-16
Payer: MEDICARE

## 2018-04-16 VITALS
DIASTOLIC BLOOD PRESSURE: 70 MMHG | WEIGHT: 215.63 LBS | BODY MASS INDEX: 27.68 KG/M2 | TEMPERATURE: 98 F | SYSTOLIC BLOOD PRESSURE: 115 MMHG | HEART RATE: 87 BPM

## 2018-04-16 DIAGNOSIS — K86.2 PANCREATIC CYST: Primary | ICD-10-CM

## 2018-04-16 PROCEDURE — 99024 POSTOP FOLLOW-UP VISIT: CPT | Mod: S$GLB,,, | Performed by: SURGERY

## 2018-04-16 PROCEDURE — 99999 PR PBB SHADOW E&M-EST. PATIENT-LVL III: CPT | Mod: PBBFAC,,, | Performed by: SURGERY

## 2018-04-16 RX ORDER — AZITHROMYCIN 250 MG/1
TABLET, FILM COATED ORAL
Qty: 6 TABLET | Refills: 0 | Status: SHIPPED | OUTPATIENT
Start: 2018-04-16 | End: 2018-04-21

## 2018-04-16 NOTE — HOSPITAL COURSE
The patient was admitted for elevated LFTs and cholelithiasis.  MRCP showed no choledocholithiasis, he was subsequently taken to the OR and underwent cholecystectomy.  His LFTs continue to trend back to normal, pain controlled, tolerating a diet and was subsequently discharged

## 2018-04-16 NOTE — DISCHARGE SUMMARY
[facility] -   General Surgery  Discharge Summary      Patient Name: Quincy Araiza  MRN: [id number]  Admission Date: 4/11/2018  Hospital Length of Stay: 2 days  Discharge Date and Time: 4/13/2018 12:28 PM  Attending Physician: Jesus Cedeno  Discharging Provider: Jesus Cedeno MD  Primary Care Provider: Wade Pineda MD    HPI:   68-year-old male referred for gallstones/elevated LFTs.  The patient reports 12 hours of abdominal pain/nausea that improved once he was in the ER.  He underwent CT scan which showed gallstones and was noted to have elevated LFTs on labs.  Currently he denies any significant abdominal pain or nausea.  He denies any fevers/chills, change in bowels, emesis, chest pain or shortness of breath    Procedure(s) (LRB):  [id number] (N/A)      Indwelling Lines/Drains at time of discharge:   Lines/Drains/Airways          No matching active lines, drains, or airways        Hospital Course: The patient was admitted for elevated LFTs and cholelithiasis.  MRCP showed no choledocholithiasis, he was subsequently taken to the OR and underwent cholecystectomy.  His LFTs continue to trend back to normal, pain controlled, tolerating a diet and was subsequently discharged    Consults:   Consults         Status Ordering Provider     Inpatient consult to Gastroenterology  Once     Provider:  Fahad Mcneil III, MD    Completed VI DURAN          Significant Diagnostic Studies: None    Pending Diagnostic Studies:     None        Final Active Diagnoses:    Diagnosis Date Noted POA    PRINCIPAL PROBLEM:  Elevated LFTs [R79.89] 04/11/2018 Yes    Biliary obstruction [K83.1] 04/11/2018 Yes    Calculus of gallbladder [K80.20] 04/11/2018 Yes    Abdominal pain [R10.9] 04/11/2018 Yes      Problems Resolved During this Admission:    Diagnosis Date Noted Date Resolved POA      Discharged Condition: good    Disposition: Home or Self Care    Follow Up:  Follow-up Information      Jesus Cedeno MD In 2 weeks.    Specialties:  General Surgery, Bariatrics  Contact information:  [address] [place] [address] DR Mary [place] [address] [phone]                 Patient Instructions:   No discharge procedures on file.  Medications:  Reconciled Home Medications:      Medication List      START taking these medications    oxyCODONE-acetaminophen 5-325 mg per tablet  Commonly known as:  PERCOCET  Take 1 tablet by mouth every 4 (four) hours as needed for Pain.        CONTINUE taking these medications    atorvastatin 40 MG tablet  Commonly known as:  LIPITOR  Take 40 mg by mouth once daily.     levothyroxine 150 MCG tablet  Commonly known as:  SYNTHROID  Take 150 mcg by mouth once daily.     lisinopril-hydrochlorothiazide 10-12.5 mg per tablet  Commonly known as:  PRINZIDE,ZESTORETIC  Take 1 tablet by mouth once daily.          Time spent on the discharge of patient: 30 minutes    Jesus Cedeno MD  General Surgery  [facility] -

## 2018-04-16 NOTE — PHYSICIAN QUERY
PT Name: Quincy Araiza  MR #: [id number]    Physician Query Form - Emergency Medicine Diagnosis Clarification     CDS/: Brandy E Capley               Contact information: [email or web address]: [phone]  This form is a permanent document in the medical record.     Query Date: April 16, 2018    By submitting this query, we are merely seeking further clarification of documentation.  Please utilize your independent clinical judgment when addressing the question(s) below.      The Medical record contains the following:     Diagnosis Supporting Clinical Information Location in Medical Record     MICHAEL (acute kidney injury)       BUN/Creatinine= 25/1.5 --> 11/1.1 --> .9  GFR= 47 --> >60 --> >60    sodium chloride 0.9% bolus 1,000 mL  :  Dose 1,000 mL  :  Intravenous  :  ED 1 Time   ED provider notes 4/12    Labs 4/11 --> 4/12 --> 4/13      MAR 4/11     Do you agree with the Emergency Medicine diagnosis of _____acute kidney injury_______?    [X ] Yes  [  ] No  [  ] Clinically Insignificant  [  ] Other/Clarification of Findings:_________________________________  [  ] Clinically Undetermined    Please document in your progress notes daily for the duration of treatment until resolved and include in your discharge summary.

## 2018-04-16 NOTE — PROGRESS NOTES
Subjective:       Patient ID: Quincy Araiza is a 68 y.o. male.    Chief Complaint: Follow-up     HPI   S/p robotic jori 4/12/18 presents for incision check. He reports coughing a lot this weekend and having increased soreness.  He has been coughing up greenish mucus and phlegm.  Denies any fevers or chills  Review of Systems    Objective:      Physical Exam   Constitutional: He appears well-developed and well-nourished. No distress.   Abdominal: Soft. He exhibits no distension. There is no tenderness.   Incisions healing as expected, no signs of infection   Vitals reviewed.      Assessment:   Status post robotic jori  Plan:       Healing as expected, keep postop follow-up  Rx Z-Addi, if cough does not improve follow-up with PCP  Follow up with GI for pancreatic cystic lesions

## 2018-04-25 ENCOUNTER — OFFICE VISIT (OUTPATIENT)
Dept: SURGERY | Facility: CLINIC | Age: 68
End: 2018-04-25
Payer: MEDICARE

## 2018-04-25 VITALS
TEMPERATURE: 98 F | WEIGHT: 215 LBS | DIASTOLIC BLOOD PRESSURE: 70 MMHG | SYSTOLIC BLOOD PRESSURE: 113 MMHG | BODY MASS INDEX: 27.6 KG/M2 | HEART RATE: 70 BPM

## 2018-04-25 DIAGNOSIS — Z90.49 S/P LAPAROSCOPIC CHOLECYSTECTOMY: ICD-10-CM

## 2018-04-25 DIAGNOSIS — K80.11 CALCULUS OF GALLBLADDER WITH CHOLECYSTITIS WITH BILIARY OBSTRUCTION, UNSPECIFIED CHOLECYSTITIS ACUITY: Primary | ICD-10-CM

## 2018-04-25 PROBLEM — R79.89 ELEVATED LFTS: Status: RESOLVED | Noted: 2018-04-11 | Resolved: 2018-04-25

## 2018-04-25 PROBLEM — K80.20 CALCULUS OF GALLBLADDER: Status: RESOLVED | Noted: 2018-04-11 | Resolved: 2018-04-25

## 2018-04-25 PROBLEM — R10.9 ABDOMINAL PAIN: Status: RESOLVED | Noted: 2018-04-11 | Resolved: 2018-04-25

## 2018-04-25 PROBLEM — K83.1 BILIARY OBSTRUCTION: Status: RESOLVED | Noted: 2018-04-11 | Resolved: 2018-04-25

## 2018-04-25 PROCEDURE — 99999 PR PBB SHADOW E&M-EST. PATIENT-LVL III: CPT | Mod: PBBFAC,,, | Performed by: SURGERY

## 2018-04-25 PROCEDURE — 99024 POSTOP FOLLOW-UP VISIT: CPT | Mod: S$GLB,,, | Performed by: SURGERY

## 2018-04-26 NOTE — PROGRESS NOTES
Subjective:       Patient ID: Quincy Araiza is a 68 y.o. male.    Chief Complaint: Follow-up     HPI   S/p robotic jori 4/12/18 presents for post op. Denies any complaints today and reports his cough has improved.  Review of Systems    Objective:      Physical Exam   Constitutional: He appears well-developed and well-nourished. No distress.   Abdominal: Soft. He exhibits no distension. There is no tenderness.   Incisions healing as expected, no signs of infection   Vitals reviewed.      FINAL PATHOLOGIC DIAGNOSIS  Gallbladder:  Cholelithiasis;  Mild chronic cholecystitis  Assessment:   Status post robotic jori  Plan:       Healing well  F/u prn  Follow up with GI for pancreatic cystic lesions

## 2018-05-07 ENCOUNTER — OFFICE VISIT (OUTPATIENT)
Dept: SURGERY | Facility: CLINIC | Age: 68
End: 2018-05-07
Payer: MEDICARE

## 2018-05-07 VITALS
SYSTOLIC BLOOD PRESSURE: 117 MMHG | TEMPERATURE: 98 F | WEIGHT: 205.5 LBS | BODY MASS INDEX: 26.38 KG/M2 | HEART RATE: 73 BPM | DIASTOLIC BLOOD PRESSURE: 68 MMHG

## 2018-05-07 DIAGNOSIS — Z90.49 S/P LAPAROSCOPIC CHOLECYSTECTOMY: Primary | ICD-10-CM

## 2018-05-07 PROCEDURE — 99024 POSTOP FOLLOW-UP VISIT: CPT | Mod: S$GLB,,, | Performed by: SURGERY

## 2018-05-07 PROCEDURE — 99999 PR PBB SHADOW E&M-EST. PATIENT-LVL III: CPT | Mod: PBBFAC,,, | Performed by: SURGERY

## 2018-05-08 NOTE — PROGRESS NOTES
Subjective:       Patient ID: Quincy Araiza is a 68 y.o. male.    Chief Complaint: Follow-up     HPI   S/p robotic jori 4/12/18 presents for post op. Denies any complaints today and reports his cough has improved.  Review of Systems    Objective:      Physical Exam   Constitutional: He appears well-developed and well-nourished. No distress.   Abdominal: Soft. He exhibits no distension. There is no tenderness.       Incisions healing as expected, no signs of infection   Vitals reviewed.      FINAL PATHOLOGIC DIAGNOSIS  Gallbladder:  Cholelithiasis;  Mild chronic cholecystitis  Assessment:   Status post robotic jori  Plan:       Reassurance  F/u prn  Patient underwent eval with outside GI for suspected IPMN

## 2019-05-22 ENCOUNTER — OFFICE VISIT (OUTPATIENT)
Dept: INFECTIOUS DISEASES | Facility: CLINIC | Age: 69
End: 2019-05-22
Payer: MEDICARE

## 2019-05-22 ENCOUNTER — CLINICAL SUPPORT (OUTPATIENT)
Dept: INFECTIOUS DISEASES | Facility: CLINIC | Age: 69
End: 2019-05-22
Payer: MEDICARE

## 2019-05-22 VITALS
WEIGHT: 217.38 LBS | SYSTOLIC BLOOD PRESSURE: 125 MMHG | HEART RATE: 67 BPM | HEIGHT: 74 IN | TEMPERATURE: 98 F | DIASTOLIC BLOOD PRESSURE: 79 MMHG | BODY MASS INDEX: 27.9 KG/M2

## 2019-05-22 DIAGNOSIS — Z71.85 VACCINE COUNSELING: ICD-10-CM

## 2019-05-22 DIAGNOSIS — Z71.84 COUNSELING ABOUT TRAVEL: Primary | ICD-10-CM

## 2019-05-22 PROCEDURE — 90717 YELLOW FEVER VACCINE SQ: ICD-10-PCS | Mod: S$GLB,,, | Performed by: INTERNAL MEDICINE

## 2019-05-22 PROCEDURE — 99999 PR PBB SHADOW E&M-EST. PATIENT-LVL III: CPT | Mod: PBBFAC,,, | Performed by: INTERNAL MEDICINE

## 2019-05-22 PROCEDURE — 99402 PR PREVENT COUNSEL,INDIV,30 MIN: ICD-10-PCS | Mod: 25,S$GLB,, | Performed by: INTERNAL MEDICINE

## 2019-05-22 PROCEDURE — 90472 HEPATITIS A VACCINE ADULT IM: ICD-10-PCS | Mod: S$GLB,,, | Performed by: INTERNAL MEDICINE

## 2019-05-22 PROCEDURE — 90632 HEPATITIS A VACCINE ADULT IM: ICD-10-PCS | Mod: S$GLB,,, | Performed by: INTERNAL MEDICINE

## 2019-05-22 PROCEDURE — 90632 HEPA VACCINE ADULT IM: CPT | Mod: S$GLB,,, | Performed by: INTERNAL MEDICINE

## 2019-05-22 PROCEDURE — 90717 YELLOW FEVER VACCINE SUBQ: CPT | Mod: S$GLB,,, | Performed by: INTERNAL MEDICINE

## 2019-05-22 PROCEDURE — 90471 IMMUNIZATION ADMIN: CPT | Mod: S$GLB,,, | Performed by: INTERNAL MEDICINE

## 2019-05-22 PROCEDURE — 90471 YELLOW FEVER VACCINE SQ: ICD-10-PCS | Mod: S$GLB,,, | Performed by: INTERNAL MEDICINE

## 2019-05-22 PROCEDURE — 90472 IMMUNIZATION ADMIN EACH ADD: CPT | Mod: S$GLB,,, | Performed by: INTERNAL MEDICINE

## 2019-05-22 PROCEDURE — 99402 PREV MED CNSL INDIV APPRX 30: CPT | Mod: 25,S$GLB,, | Performed by: INTERNAL MEDICINE

## 2019-05-22 PROCEDURE — 99999 PR PBB SHADOW E&M-EST. PATIENT-LVL III: ICD-10-PCS | Mod: PBBFAC,,, | Performed by: INTERNAL MEDICINE

## 2019-05-22 RX ORDER — ATOVAQUONE AND PROGUANIL HYDROCHLORIDE 250; 100 MG/1; MG/1
TABLET, FILM COATED ORAL
Qty: 30 TABLET | Refills: 0 | Status: SHIPPED | OUTPATIENT
Start: 2019-05-22

## 2019-05-22 RX ORDER — AZITHROMYCIN 500 MG/1
TABLET, FILM COATED ORAL
Qty: 2 TABLET | Refills: 0 | Status: SHIPPED | OUTPATIENT
Start: 2019-05-22

## 2019-05-22 NOTE — PROGRESS NOTES
INFECTIOUS DISEASE TRAVEL CLINIC  05/22/2019 9:12 AM    Subjective:      Chief Complaint: No chief complaint on file.      History of Present Illness    Patient Quincy Araiza is a 69 y.o. male who presents today for routine pretravel consultation.  The patient reports a past medical history of htn, graves, hld.   The patient will be traveling to  [facility] and [facility] from 6/16 - 6/28/19. The purpose of this trip is vacation-- going with a group to see the mountain gorillas.   The patient will be lodging hotels and meals will be provided by group.     The patient has travelled to the following other countries in the past; [place], [place].  The patient reports that they are up to date on all their childhood vaccinations.  The patient reports receipt of the following travel related vaccinations;      Have you ever received:  YES NO DATES  Routine Childhood vaccines  [x]         []       Influenza vaccine   [x]         []     Prevnar    [x]         []  One pneumonia vaccine, unsure which one  Pneumovax    []         []     Tetanus-diptheria -pertussis  [x]         []     Hepatitis A vaccine series       []         [x]        Meningitis vaccine   []         [x]     Zoster vaccine    [x]         [] new shingrex   Typhoid vaccine   []         [x]   Yellow fever vaccine   [x]         []   Japanese encephalitis vaccine []         [x]   Rabies vaccine   []         [x]     PMHx:   Past Medical History:   Diagnosis Date    Graves disease     Hyperlipidemia     Hypertension        Med:   Current Outpatient Medications on File Prior to Visit   Medication Sig Dispense Refill    atorvastatin (LIPITOR) 40 MG tablet Take 40 mg by mouth once daily.      lisinopril-hydrochlorothiazide (PRINZIDE,ZESTORETIC) 10-12.5 mg per tablet Take 1 tablet by mouth once daily.      levothyroxine (SYNTHROID) 150 MCG tablet Take 150 mcg by mouth once daily.      oxyCODONE-acetaminophen (PERCOCET) 5-325 mg per tablet Take 1 tablet by mouth every  4 (four) hours as needed for Pain. 30 tablet 0     No current facility-administered medications on file prior to visit.        Allergies: hydrocodone    Review of Symptoms:  Constitutional: Denies fevers, chills, or weakness.  Cardiovascular: Denies chest pain, palpitations  Respiratory: Denies shortness of breath, cough, hemoptysis  GI: Denies nausea/vomitting,, abd pain  : Denies dysuria  Musculoskeletal: Denies joint pain or myalgias.  Skin/breast: Denies rashes, lumps, lesions, or discharge.  Neurologic: Denies headache     Past Medical History:   Diagnosis Date    Graves disease     Hyperlipidemia     Hypertension        Past Surgical History:   Procedure Laterality Date    CHOLECYSTECTOMY      [id number] N/A 4/12/2018    Performed by Jesus Cedeno [facility] at [facility] OR       No family history on file.    Social History     Socioeconomic History    Marital status:      Spouse name: Not on file    Number of children: Not on file    Years of education: Not on file    Highest education level: Not on file   Occupational History    Not on file   Social Needs    Financial resource strain: Not on file    Food insecurity:     Worry: Not on file     Inability: Not on file    Transportation needs:     Medical: Not on file     Non-medical: Not on file   Tobacco Use    Smoking status: Never Smoker    Smokeless tobacco: Never Used   Substance and Sexual Activity    Alcohol use: No    Drug use: No    Sexual activity: Not on file   Lifestyle    Physical activity:     Days per week: Not on file     Minutes per session: Not on file    Stress: Not on file   Relationships    Social connections:     Talks on phone: Not on file     Gets together: Not on file     Attends Orthodox service: Not on file     Active member of club or organization: Not on file     Attends meetings of clubs or organizations: Not on file     Relationship status: Not on file   Other Topics Concern    Not on file    Social History Narrative    Not on file       Review of patient's allergies indicates:   Allergen Reactions    Hydrocodone Hives         Objective:   VS (24h): There were no vitals filed for this visit.  General: Afebrile, alert, comfortable, no acute distress.   HEENT: SHAILA. EOMI, no scleral icterus.   Pulmonary: Non labored  Extremities: Moves all extremities x 4.   Skin: No jaundice, rashes, or visible lesions.   Neurological:  Alert and oriented x 4.     Glucose   Date Value Ref Range Status   04/13/2018 108 70 - 110 mg/dL Final   04/12/2018 107 70 - 110 mg/dL Final   04/11/2018 157 (H) 70 - 110 mg/dL Final     Calcium   Date Value Ref Range Status   04/13/2018 8.7 8.7 - 10.5 mg/dL Final   04/12/2018 8.8 8.7 - 10.5 mg/dL Final   04/11/2018 10.5 8.7 - 10.5 mg/dL Final     Albumin   Date Value Ref Range Status   04/13/2018 3.1 (L) 3.5 - 5.2 g/dL Final   04/12/2018 3.4 (L) 3.5 - 5.2 g/dL Final   04/11/2018 3.4 (L) 3.5 - 5.2 g/dL Final     Total Protein   Date Value Ref Range Status   04/13/2018 6.2 6.0 - 8.4 g/dL Final   04/12/2018 6.6 6.0 - 8.4 g/dL Final   04/11/2018 6.4 6.0 - 8.4 g/dL Final     Sodium   Date Value Ref Range Status   04/13/2018 138 136 - 145 mmol/L Final   04/12/2018 141 136 - 145 mmol/L Final   04/11/2018 140 136 - 145 mmol/L Final     Potassium   Date Value Ref Range Status   04/13/2018 4.1 3.5 - 5.1 mmol/L Final   04/12/2018 3.8 3.5 - 5.1 mmol/L Final   04/11/2018 4.0 3.5 - 5.1 mmol/L Final     CO2   Date Value Ref Range Status   04/13/2018 26 23 - 29 mmol/L Final   04/12/2018 27 23 - 29 mmol/L Final   04/11/2018 27 23 - 29 mmol/L Final     Chloride   Date Value Ref Range Status   04/13/2018 105 95 - 110 mmol/L Final   04/12/2018 107 95 - 110 mmol/L Final   04/11/2018 101 95 - 110 mmol/L Final     BUN, Bld   Date Value Ref Range Status   04/13/2018 18 8 - 23 mg/dL Final   04/12/2018 11 8 - 23 mg/dL Final   04/11/2018 25 (H) 8 - 23 mg/dL Final     Creatinine   Date Value Ref Range Status    04/13/2018 0.9 0.5 - 1.4 mg/dL Final   04/12/2018 1.1 0.5 - 1.4 mg/dL Final   04/11/2018 1.5 (H) 0.5 - 1.4 mg/dL Final     Alkaline Phosphatase   Date Value Ref Range Status   04/13/2018 180 (H) 55 - 135 U/L Final   04/12/2018 214 (H) 55 - 135 U/L Final   04/11/2018 194 (H) 55 - 135 U/L Final     ALT   Date Value Ref Range Status   04/13/2018 471 (H) 10 - 44 U/L Final   04/12/2018 721 (H) 10 - 44 U/L Final   04/11/2018 952 (H) 10 - 44 U/L Final     AST   Date Value Ref Range Status   04/13/2018 297 (H) 10 - 40 U/L Final   04/12/2018 516 (H) 10 - 40 U/L Final   04/11/2018 858 (H) 10 - 40 U/L Final     Total Bilirubin   Date Value Ref Range Status   04/13/2018 1.1 (H) 0.1 - 1.0 mg/dL Final     Comment:     For infants and newborns, interpretation of results should be based  on gestational age, weight and in agreement with clinical  observations.  Premature Infant recommended reference ranges:  Up to 24 hours.............<8.0 mg/dL  Up to 48 hours............<12.0 mg/dL  3-5 days..................<15.0 mg/dL  6-29 days.................<15.0 mg/dL     04/12/2018 2.5 (H) 0.1 - 1.0 mg/dL Final     Comment:     For infants and newborns, interpretation of results should be based  on gestational age, weight and in agreement with clinical  observations.  Premature Infant recommended reference ranges:  Up to 24 hours.............<8.0 mg/dL  Up to 48 hours............<12.0 mg/dL  3-5 days..................<15.0 mg/dL  6-29 days.................<15.0 mg/dL     04/11/2018 5.2 (H) 0.1 - 1.0 mg/dL Final     Comment:     For infants and newborns, interpretation of results should be based  on gestational age, weight and in agreement with clinical  observations.  Premature Infant recommended reference ranges:  Up to 24 hours.............<8.0 mg/dL  Up to 48 hours............<12.0 mg/dL  3-5 days..................<15.0 mg/dL  6-29 days.................<15.0 mg/dL       WBC   Date Value Ref Range Status   04/13/2018 9.36 3.90 - 12.70  K/uL Final   04/12/2018 5.02 3.90 - 12.70 K/uL Final   04/11/2018 7.48 3.90 - 12.70 K/uL Final     Hemoglobin   Date Value Ref Range Status   04/13/2018 12.9 (L) 14.0 - 18.0 g/dL Final   04/12/2018 13.4 (L) 14.0 - 18.0 g/dL Final   04/11/2018 15.0 14.0 - 18.0 g/dL Final     Hematocrit   Date Value Ref Range Status   04/13/2018 39.1 (L) 40.0 - 54.0 % Final   04/12/2018 41.0 40.0 - 54.0 % Final   04/11/2018 44.4 40.0 - 54.0 % Final     Mean Corpuscular Volume   Date Value Ref Range Status   04/13/2018 89 82 - 98 fL Final   04/12/2018 89 82 - 98 fL Final   04/11/2018 87 82 - 98 fL Final     Platelets   Date Value Ref Range Status   04/13/2018 189 150 - 350 K/uL Final   04/12/2018 193 150 - 350 K/uL Final   04/11/2018 237 150 - 350 K/uL Final     No results found for: CHOL  No results found for: HDL  No results found for: LDLCALC  No results found for: TRIG  No results found for: CHOLHDL    HIV: No components found for: HIV 1/2 AG/AB  Hepatitis C IgG: No components found for: HEPATITIS C  Syphilis: No results found for: RPR    Hepatitis A IgG: No components found for: HEPATITIS A IGG  Hepatitis Bc IgG: No components found for: HEPATITIS B CORE IGG  Hepatitis Bs IgG:  Quantiferon: No results found for: QUANTIFERON        Assessment:     Pre-Travel clinic assessment  Vaccine counseling    Plan:     Patient specific risks:      The patient was provided with an extensive travel guidance packet which provides travel information specific to the patients itinerary.     The patient's medical history was reviewed and the patient was counseled on:    Precautions against development of DVT during flight.    Registering with the state department and travel insurance was recommended in case of emergency.     Destination specific risks:      -Infectious Disease risks:      Mosquito Borne pathogens:  Reviewed basic mosquito avoidance precautions including wearing long sleeve clothing and insect repellant.  to prevent insect-borne  diseases (e.g., malaria, dengue).The patient was also instructed to purchase insect repellent containing DEET (25-35%; higher strengths last longer, but >35% will damage synthetic materials) and apply premethrin spray on clothing according to repellent label instructions.    An anti-malarial agent was prescribed for malaria prophylaxis and possible side effects were reviewed. Atovaquone-proguanil 250-100 mg PO qdaily, start 2 days before expected exposure and end 7 days after last day of exposure.    Food Borne pathogens:  Reviewed basic hand, food and water sanitation precautions.  Patient instructed to take hand  on their trip. The patient was instructed to purchase Imodium over the counter to take in case diarrhea (without blood or fever) develops. Azithromycin was ordered for treatment if severe or bloody diarrhea develops and the patient was instructed on use and possible side effects.     STDs:  Risk of STDs reviewed with the patient. Discussed Personal protective measures     Rabies: Discussed the risk of rabies and precautions to prevent exposure. Patient is aware to seek prompt medical care should they be exposed.       -Environmental risks:     Personal and travel safety. Precautions to minimize risk/exposure to crime and motor vehicle accidents were reviewed with the patient.    Precautions against sun exposure.    -Vaccinations:  The patient's immunization history was reviewed and, based on the patient's itinerary, the following immunizations were ordered:  - Hepatitis A 0, 6 months  - Typhoid PO (sent to pharmacy)  - Yellow fever vaccine [ offered medical waiver 2/2 age>60, but patient requested vaccine]     Declined:   - Menactra    The patient was encouraged to contact us about any problems that may develop after immunization and possible side effects were reviewed.    Because of the nationwide shortage of Yellow Fever vaccine, the patient was offered Stamaril vaccine through the research  protocol. Inclusion and exclusion criteria were reviewed with the patient and the form completed. Risks and benefits were discussed.     The consent was reviewed by me in detail with the patient and all questions were answered. The patient agrees to participate, and signed the consent. The patient was advised to return and or report any significant side effects related to the vaccine.    The patient was instructed to contact us if problems develop after travel.    > 30 min spent with patient and >50% of time spent counseling about travel    Stacy Ling MD, MPH  Infectious Disease

## (undated) DEVICE — SYR 3CC LUER LOC

## (undated) DEVICE — TUBING HEATED INSUFFLATOR

## (undated) DEVICE — GLOVE PROTEXIS HYDROGEL SZ7

## (undated) DEVICE — GLOVE 7.0 PROTEXIS PI BLUE

## (undated) DEVICE — MANIFOLD 4 PORT

## (undated) DEVICE — DRAPE COLUMN DAVINCI XI

## (undated) DEVICE — SUPPORT ULNA NERVE PROTECTOR

## (undated) DEVICE — NDL SAFETY 22G X 1.5 ECLIPSE

## (undated) DEVICE — CLIP HEMO-LOK ML

## (undated) DEVICE — POSITIONER HEAD DONUT 9IN FOAM

## (undated) DEVICE — DRAPE ABDOMINAL TIBURON 14X11

## (undated) DEVICE — KIT ANTIFOG

## (undated) DEVICE — CLIPPER BLADE MOD 4406 (CAREF)

## (undated) DEVICE — SEAL UNIVERSAL 5MM-8MM XI

## (undated) DEVICE — SCISSOR 5MMX35CM DIRECT DRIVE

## (undated) DEVICE — OBTURATOR BLADELESS 8MM XI CLR

## (undated) DEVICE — SOL NS 1000CC

## (undated) DEVICE — EVACUATOR KIT SMOKE PLUME AWAY

## (undated) DEVICE — SEE MEDLINE ITEM 152622

## (undated) DEVICE — SUT MONOCRYL 4.0 PS2 CP496G

## (undated) DEVICE — APPLICATOR CHLORAPREP ORN 26ML

## (undated) DEVICE — BAG TISSUE RETRIEVAL 5MM

## (undated) DEVICE — SEE MEDLINE ITEM 157131

## (undated) DEVICE — SOL STRL WATER INJ 1000ML BG

## (undated) DEVICE — ADHESIVE DERMABOND ADVANCED

## (undated) DEVICE — SYR 10CC LUER LOCK

## (undated) DEVICE — SEE MEDLINE ITEM 157117

## (undated) DEVICE — COVER TIP CURVED SCISSORS XI

## (undated) DEVICE — COVER OVERHEAD SURG LT BLUE

## (undated) DEVICE — DECANTER VIAL ASEPTIC TRANSFER

## (undated) DEVICE — ENDOWRIST SUCTION IRRIGATOR

## (undated) DEVICE — ELECTRODE REM PLYHSV RETURN 9

## (undated) DEVICE — DRAPE ARM DAVINCI XI

## (undated) DEVICE — LINER GLOVE POWDERFREE SZ 7

## (undated) DEVICE — SEE MEDLINE ITEM 157027